# Patient Record
Sex: MALE | Race: BLACK OR AFRICAN AMERICAN | NOT HISPANIC OR LATINO | URBAN - METROPOLITAN AREA
[De-identification: names, ages, dates, MRNs, and addresses within clinical notes are randomized per-mention and may not be internally consistent; named-entity substitution may affect disease eponyms.]

---

## 2018-01-05 ENCOUNTER — EMERGENCY (EMERGENCY)
Facility: HOSPITAL | Age: 28
LOS: 0 days | Discharge: HOME | End: 2018-01-06

## 2018-01-05 DIAGNOSIS — J45.909 UNSPECIFIED ASTHMA, UNCOMPLICATED: ICD-10-CM

## 2018-01-05 DIAGNOSIS — R19.7 DIARRHEA, UNSPECIFIED: ICD-10-CM

## 2018-01-05 DIAGNOSIS — B01.9 VARICELLA WITHOUT COMPLICATION: ICD-10-CM

## 2018-01-05 DIAGNOSIS — Z91.013 ALLERGY TO SEAFOOD: ICD-10-CM

## 2018-01-05 DIAGNOSIS — Z91.010 ALLERGY TO PEANUTS: ICD-10-CM

## 2018-01-05 DIAGNOSIS — R21 RASH AND OTHER NONSPECIFIC SKIN ERUPTION: ICD-10-CM

## 2018-01-06 ENCOUNTER — EMERGENCY (EMERGENCY)
Facility: HOSPITAL | Age: 28
LOS: 0 days | Discharge: HOME | End: 2018-01-06

## 2018-01-06 DIAGNOSIS — J45.909 UNSPECIFIED ASTHMA, UNCOMPLICATED: ICD-10-CM

## 2018-01-06 DIAGNOSIS — R50.9 FEVER, UNSPECIFIED: ICD-10-CM

## 2018-01-06 DIAGNOSIS — R21 RASH AND OTHER NONSPECIFIC SKIN ERUPTION: ICD-10-CM

## 2018-01-06 DIAGNOSIS — K13.0 DISEASES OF LIPS: ICD-10-CM

## 2018-01-06 DIAGNOSIS — Z91.013 ALLERGY TO SEAFOOD: ICD-10-CM

## 2018-01-06 DIAGNOSIS — R59.0 LOCALIZED ENLARGED LYMPH NODES: ICD-10-CM

## 2018-01-06 DIAGNOSIS — Z91.010 ALLERGY TO PEANUTS: ICD-10-CM

## 2018-03-04 ENCOUNTER — EMERGENCY (EMERGENCY)
Facility: HOSPITAL | Age: 28
LOS: 0 days | Discharge: HOME | End: 2018-03-04
Attending: STUDENT IN AN ORGANIZED HEALTH CARE EDUCATION/TRAINING PROGRAM

## 2018-03-04 VITALS
OXYGEN SATURATION: 98 % | TEMPERATURE: 98 F | RESPIRATION RATE: 19 BRPM | DIASTOLIC BLOOD PRESSURE: 94 MMHG | SYSTOLIC BLOOD PRESSURE: 141 MMHG | HEART RATE: 97 BPM

## 2018-03-04 VITALS
HEART RATE: 80 BPM | TEMPERATURE: 98 F | RESPIRATION RATE: 18 BRPM | DIASTOLIC BLOOD PRESSURE: 98 MMHG | SYSTOLIC BLOOD PRESSURE: 149 MMHG | OXYGEN SATURATION: 98 %

## 2018-03-04 DIAGNOSIS — F17.210 NICOTINE DEPENDENCE, CIGARETTES, UNCOMPLICATED: ICD-10-CM

## 2018-03-04 DIAGNOSIS — Z91.013 ALLERGY TO SEAFOOD: ICD-10-CM

## 2018-03-04 DIAGNOSIS — Z91.010 ALLERGY TO PEANUTS: ICD-10-CM

## 2018-03-04 DIAGNOSIS — F10.20 ALCOHOL DEPENDENCE, UNCOMPLICATED: ICD-10-CM

## 2018-03-04 DIAGNOSIS — K92.1 MELENA: ICD-10-CM

## 2018-03-04 DIAGNOSIS — J45.909 UNSPECIFIED ASTHMA, UNCOMPLICATED: ICD-10-CM

## 2018-03-04 DIAGNOSIS — K92.2 GASTROINTESTINAL HEMORRHAGE, UNSPECIFIED: ICD-10-CM

## 2018-03-04 LAB
ALBUMIN SERPL ELPH-MCNC: 4.4 G/DL — SIGNIFICANT CHANGE UP (ref 3–5.5)
ALP SERPL-CCNC: 68 U/L — SIGNIFICANT CHANGE UP (ref 30–115)
ALT FLD-CCNC: 92 U/L — HIGH (ref 0–41)
ANION GAP SERPL CALC-SCNC: 14 MMOL/L — SIGNIFICANT CHANGE UP (ref 7–14)
APTT BLD: 26.8 SEC — LOW (ref 27–39.2)
AST SERPL-CCNC: 131 U/L — HIGH (ref 0–41)
BILIRUB DIRECT SERPL-MCNC: 0.9 MG/DL — HIGH (ref 0–0.2)
BILIRUB INDIRECT FLD-MCNC: 2.1 MG/DL — SIGNIFICANT CHANGE UP
BILIRUB SERPL-MCNC: 3 MG/DL — HIGH (ref 0.2–1.2)
BUN SERPL-MCNC: 9 MG/DL — LOW (ref 10–20)
CALCIUM SERPL-MCNC: 8.5 MG/DL — SIGNIFICANT CHANGE UP (ref 8.5–10.1)
CHLORIDE SERPL-SCNC: 99 MMOL/L — SIGNIFICANT CHANGE UP (ref 98–110)
CO2 SERPL-SCNC: 23 MMOL/L — SIGNIFICANT CHANGE UP (ref 17–32)
CREAT SERPL-MCNC: 0.9 MG/DL — SIGNIFICANT CHANGE UP (ref 0.7–1.5)
GLUCOSE SERPL-MCNC: 105 MG/DL — SIGNIFICANT CHANGE UP (ref 70–110)
HCT VFR BLD CALC: 46 % — SIGNIFICANT CHANGE UP (ref 42–52)
HGB BLD-MCNC: 16.4 G/DL — SIGNIFICANT CHANGE UP (ref 14–18)
INR BLD: 1.13 RATIO — SIGNIFICANT CHANGE UP (ref 0.65–1.3)
MCHC RBC-ENTMCNC: 32.9 PG — HIGH (ref 27–31)
MCHC RBC-ENTMCNC: 35.7 G/DL — SIGNIFICANT CHANGE UP (ref 32–37)
MCV RBC AUTO: 92.2 FL — SIGNIFICANT CHANGE UP (ref 80–94)
NRBC # BLD: 0 /100 WBCS — SIGNIFICANT CHANGE UP (ref 0–0)
PLATELET # BLD AUTO: 247 K/UL — SIGNIFICANT CHANGE UP (ref 130–400)
POTASSIUM SERPL-MCNC: 5.5 MMOL/L — HIGH (ref 3.5–5)
POTASSIUM SERPL-SCNC: 5.5 MMOL/L — HIGH (ref 3.5–5)
PROT SERPL-MCNC: 6.7 G/DL — SIGNIFICANT CHANGE UP (ref 6–8)
PROTHROM AB SERPL-ACNC: 12.3 SEC — SIGNIFICANT CHANGE UP (ref 9.95–12.87)
RBC # BLD: 4.99 M/UL — SIGNIFICANT CHANGE UP (ref 4.7–6.1)
RBC # FLD: 12.1 % — SIGNIFICANT CHANGE UP (ref 11.5–14.5)
SODIUM SERPL-SCNC: 136 MMOL/L — SIGNIFICANT CHANGE UP (ref 135–146)
WBC # BLD: 9.3 K/UL — SIGNIFICANT CHANGE UP (ref 4.8–10.8)
WBC # FLD AUTO: 9.3 K/UL — SIGNIFICANT CHANGE UP (ref 4.8–10.8)

## 2018-03-04 RX ORDER — ONDANSETRON 8 MG/1
4 TABLET, FILM COATED ORAL ONCE
Qty: 0 | Refills: 0 | Status: COMPLETED | OUTPATIENT
Start: 2018-03-04 | End: 2018-03-04

## 2018-03-04 RX ORDER — PANTOPRAZOLE SODIUM 20 MG/1
40 TABLET, DELAYED RELEASE ORAL ONCE
Qty: 0 | Refills: 0 | Status: COMPLETED | OUTPATIENT
Start: 2018-03-04 | End: 2018-03-04

## 2018-03-04 RX ORDER — SODIUM CHLORIDE 9 MG/ML
1000 INJECTION INTRAMUSCULAR; INTRAVENOUS; SUBCUTANEOUS ONCE
Qty: 0 | Refills: 0 | Status: COMPLETED | OUTPATIENT
Start: 2018-03-04 | End: 2018-03-04

## 2018-03-04 RX ADMIN — SODIUM CHLORIDE 1000 MILLILITER(S): 9 INJECTION INTRAMUSCULAR; INTRAVENOUS; SUBCUTANEOUS at 10:54

## 2018-03-04 RX ADMIN — Medication 3030 MILLIGRAM(S): at 10:55

## 2018-03-04 RX ADMIN — ONDANSETRON 4 MILLIGRAM(S): 8 TABLET, FILM COATED ORAL at 10:55

## 2018-03-04 RX ADMIN — PANTOPRAZOLE SODIUM 40 MILLIGRAM(S): 20 TABLET, DELAYED RELEASE ORAL at 10:55

## 2018-03-04 NOTE — ED PROVIDER NOTE - MEDICAL DECISION MAKING DETAILS
I personally evaluated the patient. I reviewed the Resident’s or Physician Assistant’s note (as assigned above), and agree with the findings and plan except as documented in my note. 26yo M no pmh as above, p/w mult episodes of bloody vomiting x 3 days, improved today. + black stool, + abd pain, no prior GI bleed or scope. No prior surgery. Drinks about 50 oz ETOH QD, no other drugs. + decreased PO. PE: as noted, abd s/t, no r/g, rectal above. Impression GIB: gastritis vs PUD. Plan Labs, IVF, PPI, reassess

## 2018-03-04 NOTE — ED ADULT TRIAGE NOTE - CHIEF COMPLAINT QUOTE
patient complaining of vomiting with blood and dark black stools that began 2 days ago, abdominal pain located in mid region, non radiating, described as "gut wrenching" patient states he is a daily drinker of maltliquor

## 2018-03-04 NOTE — ED PROVIDER NOTE - CARE PLAN
Principal Discharge DX:	GI bleed  Secondary Diagnosis:	Alcoholism  Secondary Diagnosis:	Nausea & vomiting

## 2018-03-04 NOTE — ED PROVIDER NOTE - PROGRESS NOTE DETAILS
pt reports resolution of tremors with ativan. He also reports resolution of n/v. He relates that he did not vomiting blood today. counseled that needs GI follow up for endoscopy. he reports he will follow up this week. counseled to return if symptoms return. Counseled on red flags and to return for them. Counseled on importance of follow up. Patient repeats back instructions.

## 2018-03-04 NOTE — ED PROVIDER NOTE - NS ED ROS FT
Review of Systems    Constitutional: (-) fever  Eyes/ENT: (-) blurry vision  Cardiovascular: (-) chest pain, (-) syncope  Respiratory: (-) cough, (-) shortness of breath  Gastrointestinal:  (-) diarrhea  Musculoskeletal: (-) neck pain, (-) back pain  Integumentary: (-) rash, (-) edema  Neurological: (-) headache, (-) altered mental status  Allergic/Immunologic: (-) pruritus

## 2018-03-04 NOTE — ED PROVIDER NOTE - OBJECTIVE STATEMENT
26 y/o M with PMH ETOH abuse (50oz beer per day), asthma, tobacco use presents with black stool x today and nausea with 10 episodes of progressively improving bright red vomiting x 3 days s/p alcohol binge. +abdominal cramping. He has been unable to drink his typical amount of ETOH today and yesterday due to nausea. He denies fevers, chills, sweats, recent travel, recent illness, hx of prior, CP, SOB, [place], lightheadedness, palpitations, pallor, weakness, dizziness, difficulty walking, trauma, drug use, surgeries, recent endoscopy/colonoscopy, other symptoms, abdominal surgeries in past, back pain, LOC.

## 2018-03-04 NOTE — ED PROVIDER NOTE - PHYSICAL EXAMINATION
PHYSICAL EXAM:    GENERAL: Alert, appears stated age, well appearing, non-toxic  SKIN: Warm, pink and dry. MMM.   EYE: Normal lids/conjunctiva  ENT: Normal hearing, patent oropharynx  NECK: +supple. No meningismus  Pulm: Bilateral BS, normal resp effort, no wheezes, stridor, or retractions  CV: RRR, no M/R/G, 2+ pulses throughout  Abd: soft, non-tender, non-distended, no hepatosplenomegaly. no CVA tenderness.   RECTAL: anorectal area without hemorrhoids or lesions. dark brown stool hemoccult negative. chaperoned by ROXANE Mahtews: no erythema, cyanosis, edema  Neuro: AAOx3, no gross motor deficits. No speech slurring. 5/5 strength throughout. normal gait.

## 2018-03-10 ENCOUNTER — EMERGENCY (EMERGENCY)
Facility: HOSPITAL | Age: 28
LOS: 0 days | Discharge: HOME | End: 2018-03-11
Attending: EMERGENCY MEDICINE

## 2018-03-10 VITALS
HEART RATE: 135 BPM | RESPIRATION RATE: 22 BRPM | DIASTOLIC BLOOD PRESSURE: 69 MMHG | WEIGHT: 184.97 LBS | SYSTOLIC BLOOD PRESSURE: 167 MMHG | HEIGHT: 71 IN | OXYGEN SATURATION: 98 %

## 2018-03-10 DIAGNOSIS — F91.8 OTHER CONDUCT DISORDERS: ICD-10-CM
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
## 2018-03-10 DIAGNOSIS — S00.01XA ABRASION OF SCALP, INITIAL ENCOUNTER: ICD-10-CM

## 2018-03-10 DIAGNOSIS — F20.9 SCHIZOPHRENIA, UNSPECIFIED: ICD-10-CM

## 2018-03-10 DIAGNOSIS — S00.81XA ABRASION OF OTHER PART OF HEAD, INITIAL ENCOUNTER: ICD-10-CM

## 2018-03-10 DIAGNOSIS — Z91.013 ALLERGY TO SEAFOOD: ICD-10-CM
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
## 2018-03-10 DIAGNOSIS — S09.90XA UNSPECIFIED INJURY OF HEAD, INITIAL ENCOUNTER: ICD-10-CM

## 2018-03-10 DIAGNOSIS — Z91.010 ALLERGY TO PEANUTS: ICD-10-CM
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
## 2018-03-10 NOTE — ED PROVIDER NOTE - PHYSICAL EXAMINATION
VITAL SIGNS: I have reviewed nursing notes and confirm.  CONSTITUTIONAL: Well-developed; well-nourished; in no acute distress.  SKIN: +0.5cm abrasion to occiput. +1cm abrasion to chin. Remainder of skin exam is warm and dry, no acute rash.  HEAD: Normocephalic; atraumatic.  EYES: PERRL, EOM intact; conjunctiva and sclera clear.  ENT: No nasal discharge; airway clear.   NECK: Supple; non tender. No C-spine tenderness.  CARD: S1, S2 normal; no murmurs, gallops, or rubs. Regular rate and rhythm.  RESP: Clear to auscultation bilaterally. No wheezes, rales or rhonchi.  ABD: Normal bowel sounds; soft; non-distended; non-tender.  EXT: Normal ROM. No edema.  LYMPH: No acute cervical adenopathy.  NEURO: Alert, oriented. Grossly unremarkable. No focal deficits.

## 2018-03-10 NOTE — ED PROVIDER NOTE - NS ED ROS FT
Review of Systems:  	•	CONSTITUTIONAL - no fever, no diaphoresis, no chills  	•	SKIN - no rash  	•	HEMATOLOGIC - no bleeding, no bruising  	•	EYES - no eye pain, no blurry vision  	•	ENT - no change in hearing, no ear pain or tinnitus  	•	RESPIRATORY - no shortness of breath, no cough  	•	CARDIAC - no chest pain, no palpitations  	•	GI - no abd pain, no nausea, no vomiting, no diarrhea, no constipation  	•	MUSCULOSKELETAL - no joint paint, no swelling, no redness  	•	NEUROLOGIC - no weakness, no headache, no paresthesias, no LOC  	•	PSYCH - +schizophrenia, +bipolar, non suicidal, non homicidal
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
## 2018-03-11 VITALS — HEART RATE: 118 BPM

## 2018-03-11 DIAGNOSIS — R45.6 VIOLENT BEHAVIOR: ICD-10-CM

## 2018-03-11 DIAGNOSIS — F60.9 PERSONALITY DISORDER, UNSPECIFIED: ICD-10-CM

## 2018-03-11 DIAGNOSIS — F10.20 ALCOHOL DEPENDENCE, UNCOMPLICATED: ICD-10-CM
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
## 2018-03-11 NOTE — ED BEHAVIORAL HEALTH ASSESSMENT NOTE - DETAILS
gf has alcohol use d/o , they drink together. gi bleed pt was offered dual focus referral and detox/rehab- refused violent pt claims that he overdosed on a substance - unable to say what- and was tx'ed at [facility]

## 2018-03-11 NOTE — ED BEHAVIORAL HEALTH ASSESSMENT NOTE - RISK ASSESSMENT
pt may develop alcohol withdrawal sxs in a few hours.    Pt is not acutely manic, depressed or psychotic. He is not acutely suicidal or homicidal.
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
## 2018-12-27 ENCOUNTER — EMERGENCY (EMERGENCY)
Facility: HOSPITAL | Age: 28
LOS: 0 days | Discharge: HOME | End: 2018-12-27
Attending: EMERGENCY MEDICINE | Admitting: EMERGENCY MEDICINE

## 2018-12-27 VITALS
RESPIRATION RATE: 18 BRPM | TEMPERATURE: 97 F | SYSTOLIC BLOOD PRESSURE: 140 MMHG | DIASTOLIC BLOOD PRESSURE: 98 MMHG | OXYGEN SATURATION: 98 % | HEART RATE: 80 BPM

## 2018-12-27 VITALS
SYSTOLIC BLOOD PRESSURE: 169 MMHG | OXYGEN SATURATION: 99 % | TEMPERATURE: 96 F | RESPIRATION RATE: 18 BRPM | HEART RATE: 82 BPM | DIASTOLIC BLOOD PRESSURE: 109 MMHG

## 2018-12-27 DIAGNOSIS — S29.9XXA UNSPECIFIED INJURY OF THORAX, INITIAL ENCOUNTER: ICD-10-CM

## 2018-12-27 DIAGNOSIS — Z91.013 ALLERGY TO SEAFOOD: ICD-10-CM
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
## 2018-12-27 DIAGNOSIS — J45.909 UNSPECIFIED ASTHMA, UNCOMPLICATED: ICD-10-CM
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
## 2018-12-27 DIAGNOSIS — S22.32XA FRACTURE OF ONE RIB, LEFT SIDE, INITIAL ENCOUNTER FOR CLOSED FRACTURE: ICD-10-CM
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
## 2018-12-27 DIAGNOSIS — R25.1 TREMOR, UNSPECIFIED: ICD-10-CM

## 2018-12-27 DIAGNOSIS — Z91.010 ALLERGY TO PEANUTS: ICD-10-CM

## 2018-12-27 PROBLEM — F10.20 ALCOHOL DEPENDENCE, UNCOMPLICATED: Chronic | Status: ACTIVE | Noted: 2018-03-04

## 2018-12-27 NOTE — ED ADULT NURSE NOTE - MODE OF DISCHARGE
PRE-SEDATION ASSESSMENT    CONSENT  Consent for procedure and sedation obtained: Yes    MEDICAL HISTORY       PHYSICAL EXAM  History and Physical Reviewed: Patient has valid H&P within 30 days. I have reviewed and there are no changes.  Airway Risk History: No previous complications  Airway Anatomy : Class III  Heart : Normal  Lungs : Normal  LOC/Mental Status : Normal    OTHER FINDINGS  Reviewed current medications and allergies: Yes  Pertinent lab/diagnostic test reviewed: Yes    SEDATION RISK ASSESSMENT  Risk Status ASA: Class III - Severe systemic disease, limits activity, is not incapacitated  Plan for Sedation: Moderate Sedation  EKG Monitoring: Yes    NARRATIVE FINDINGS      Ambulatory

## 2018-12-27 NOTE — ED ADULT NURSE NOTE - NSIMPLEMENTINTERV_GEN_ALL_ED
Implemented All Universal Safety Interventions:  [place] to call system. Call bell, personal items and telephone within reach. Instruct patient to call for assistance. Room bathroom lighting operational. Non-slip footwear when patient is off stretcher. Physically safe environment: no spills, clutter or unnecessary equipment. Stretcher in lowest position, wheels locked, appropriate side rails in place.

## 2018-12-27 NOTE — ED PROVIDER NOTE - CARE PROVIDER_API CALL
Murali Briggs (DO), Critical Care Medicine; Pulmonary Disease; Sleep Medicine  [address] [place] [address]  Phone: [phone]  Fax: [phone]

## 2018-12-27 NOTE — ED PROVIDER NOTE - MEDICAL DECISION MAKING DETAILS
rib contusion as above, lungs cta abd snt, supportive care. Patient counseled regarding conditions which should prompt return.

## 2018-12-27 NOTE — ED PROVIDER NOTE - NS ED ROS FT
Review of Systems    Constitutional: (-) fever  Eyes/ENT: (-) blurry vision,  Cardiovascular: (-) chest pain,   Respiratory: (-) cough, (-) shortness of breath +left rib pain  Gastrointestinal:(-) abdominal pain  Musculoskeletal: (-) back pain,   Integumentary: (-) rash, +bruising to left back  Neurological: (-) headache,   Psychiatric: (-) hallucinations  Allergic/Immunologic: (-) pruritus

## 2018-12-27 NOTE — ED PROVIDER NOTE - OBJECTIVE STATEMENT
29 y/o male with hx of alcohol abuse, personality disorder, s/p fall 3 days ago striking his left chest on metal grill. patient denies any neck or back pain . patient denies any head injury. patient states fall from standing position. patient c/o pain with deep inspiration and movement. patient denies any gross hematuria or abdominal pain.

## 2018-12-27 NOTE — ED PROVIDER NOTE - PHYSICAL EXAMINATION
Vital Signs: I have reviewed the initial vital signs.  Constitutional: well-nourished, no acute distress  Head: atraumatic and normocephalic  Eyes:PERRLA, EOMI,  clear conjunctiva  ENT:  MMM  Cardiovascular: regular rate, regular rhythm, well-perfused extremities  Respiratory: unlabored respiratory effort, clear to auscultation bilaterally, +tenderness to left lower mid axillary ribs/ no crepitation  Gastrointestinal: soft, non-tender abdomen, no CVA tenderness  Neuro: awake, alert, follows commands, oriented, no focal deficits, GCS 15  skin: +resolving bruising to left mid back   msk: +tremulous

## 2018-12-27 NOTE — ED PROVIDER NOTE - CARE PLAN
Principal Discharge DX:	Fall, initial encounter  Secondary Diagnosis:	Closed fracture of one rib of left side, initial encounter

## 2019-02-16 ENCOUNTER — EMERGENCY (EMERGENCY)
Facility: HOSPITAL | Age: 29
LOS: 0 days | Discharge: HOME | End: 2019-02-16
Attending: EMERGENCY MEDICINE | Admitting: EMERGENCY MEDICINE

## 2019-02-16 VITALS
DIASTOLIC BLOOD PRESSURE: 106 MMHG | SYSTOLIC BLOOD PRESSURE: 166 MMHG | TEMPERATURE: 98 F | RESPIRATION RATE: 18 BRPM | HEART RATE: 80 BPM | OXYGEN SATURATION: 97 %

## 2019-02-16 VITALS
OXYGEN SATURATION: 97 % | SYSTOLIC BLOOD PRESSURE: 165 MMHG | HEART RATE: 107 BPM | RESPIRATION RATE: 18 BRPM | TEMPERATURE: 99 F | HEIGHT: 74 IN | WEIGHT: 179.9 LBS | DIASTOLIC BLOOD PRESSURE: 97 MMHG
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
## 2019-02-16 DIAGNOSIS — R51 HEADACHE: ICD-10-CM

## 2019-02-16 DIAGNOSIS — F17.210 NICOTINE DEPENDENCE, CIGARETTES, UNCOMPLICATED: ICD-10-CM

## 2019-02-16 DIAGNOSIS — J45.909 UNSPECIFIED ASTHMA, UNCOMPLICATED: ICD-10-CM
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
## 2019-02-16 DIAGNOSIS — M79.672 PAIN IN LEFT FOOT: ICD-10-CM

## 2019-02-16 DIAGNOSIS — F90.9 ATTENTION-DEFICIT HYPERACTIVITY DISORDER, UNSPECIFIED TYPE: ICD-10-CM

## 2019-02-16 DIAGNOSIS — Z91.010 ALLERGY TO PEANUTS: ICD-10-CM

## 2019-02-16 DIAGNOSIS — S69.91XA UNSPECIFIED INJURY OF RIGHT WRIST, HAND AND FINGER(S), INITIAL ENCOUNTER: ICD-10-CM
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
## 2019-02-16 DIAGNOSIS — Z23 ENCOUNTER FOR IMMUNIZATION: ICD-10-CM

## 2019-02-16 DIAGNOSIS — S01.01XA LACERATION WITHOUT FOREIGN BODY OF SCALP, INITIAL ENCOUNTER: ICD-10-CM
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
## 2019-02-16 PROBLEM — F60.9 PERSONALITY DISORDER, UNSPECIFIED: Chronic | Status: ACTIVE | Noted: 2018-12-27

## 2019-02-16 RX ORDER — KETOROLAC TROMETHAMINE 30 MG/ML
30 SYRINGE (ML) INJECTION ONCE
Qty: 0 | Refills: 0 | Status: DISCONTINUED | OUTPATIENT
Start: 2019-02-16 | End: 2019-02-16

## 2019-02-16 RX ORDER — TETANUS TOXOID, REDUCED DIPHTHERIA TOXOID AND ACELLULAR PERTUSSIS VACCINE, ADSORBED 5; 2.5; 8; 8; 2.5 [IU]/.5ML; [IU]/.5ML; UG/.5ML; UG/.5ML; UG/.5ML
0.5 SUSPENSION INTRAMUSCULAR ONCE
Qty: 0 | Refills: 0 | Status: COMPLETED | OUTPATIENT
Start: 2019-02-16 | End: 2019-02-16

## 2019-02-16 RX ORDER — SODIUM CHLORIDE 9 MG/ML
1000 INJECTION INTRAMUSCULAR; INTRAVENOUS; SUBCUTANEOUS ONCE
Qty: 0 | Refills: 0 | Status: COMPLETED | OUTPATIENT
Start: 2019-02-16 | End: 2019-02-16

## 2019-02-16 RX ADMIN — TETANUS TOXOID, REDUCED DIPHTHERIA TOXOID AND ACELLULAR PERTUSSIS VACCINE, ADSORBED 0.5 MILLILITER(S): 5; 2.5; 8; 8; 2.5 SUSPENSION INTRAMUSCULAR at 19:00

## 2019-02-16 RX ADMIN — Medication 30 MILLIGRAM(S): at 20:15

## 2019-02-16 RX ADMIN — SODIUM CHLORIDE 1000 MILLILITER(S): 9 INJECTION INTRAMUSCULAR; INTRAVENOUS; SUBCUTANEOUS at 16:46

## 2019-02-16 NOTE — ED ADULT NURSE NOTE - CHIEF COMPLAINT QUOTE
fell   offf  [facility]   9   feet   onto  pavers   last   night      injury   R   hand    L   foot   laceration  to  scalp

## 2019-02-16 NOTE — ED PROVIDER NOTE - NS ED ROS FT
Constitutional: (-) fever (-) malaise (-) diaphoresis (-) chills   Eyes: (-) visual changes (-) eye pain   ENMT: (-) nasal or chest congestion (-) runny nose (-) hearing changes (-) ear pain (-) neck pain (-) neck stiffness  Cardiac: (-) chest pain (-) palpitations (-) syncope  Respiratory: (-) cough (-) SOB  GI: (-) nausea (-) vomiting (-) diarrhea (-) abdominal pain   : (-) dysuria   MS: (+) pain to right 4th digit (+) left foot pain (-) back pain (-) muscle weakness  Neuro: (+) headache (+) head injury (-) dizziness (-) numbness/tingling to extremities B/L (-) weakness (-) LOC (-) AMS   Skin:  (+) laceration to scalp (-) rash  Psychiatric: (-) hallucinations (-) SI/HI (-) intoxication  Except as documented in the HPI, all other systems are negative.

## 2019-02-16 NOTE — ED PROVIDER NOTE - PHYSICAL EXAMINATION
GENERAL: Well-nourished, Well-developed. NAD.  HEAD: + palpable hematoma to right side of head behind ear.  No ecchymosis behind ears B/L.  Eyes: PERRLA, EOMI. No asymmetry. No nystagmus. No conjunctival injection. Non-icteric sclera.  ENMT: MMM. No pharyngeal erythema or exudates. Uvula midline. No oral lesions. No broken teeth. TMs clear with good cone of light B/L. No hemotympanum B/L.  Nares patent. No tongue fassiculations.  Neck: Supple. No LAD. No cervical midline TTP. No paraveretebral TTP to traps. FROM  CVS: No reproducible chest wall tenderness. Normal S1,S2. No murmurs appreciated on auscultation   RESP: No use of accessory muscles. Chest rise symmetrical with good expansion. Lungs clear to auscultation B/L. No wheezing, rales, or rhonchi auscultated.  GI: Normal auscultation of bowel sounds in all 4 quadrants. Soft, Nontender, Nondistended. No guarding or rebound tenderness.   MSK: Limited ROM to 4th digit of right hand with focal TTP to PIP with mild swelling.  Pain worse with extension of right 4th digit. + TTP to dorsal region of right foot. Pain worse with dorsiflexion. Extremities w/o deformity. FROM of upper and lower extremities B/L. No midline spinal TTP. FROM of back with flexion and extension. No vertebral step off. No visisble signs of trauma  Skin: + 3.5 cm laceration to parietal region of scalp with no surrounding erythema. No signs of infection. No active bleeding. No FB.  + superficial abrasion behind right ear overlying palpable hematoma. Warm, Dry. No rashes or lesions. Good cap refill < 2 sec B/L.  EXT: Radial and pedal pulses present B/L. No calf tenderness or swelling B/L. No pedal edema B/L.  Neuro: AA&O x 3. CNs II-XII grossly intact. Speaking in full sentences. No slurring of speech. No facial droop. No tremors. Sensation grossly intact. Strength 5/5 B/L. Gait within normal limits. Negative Romberg and Pronator Drift. Normal Finger to nose exam. Visual fields intact.  Psych:  Appropriate mood and affect. Cooperative. Calm/Anxious. GENERAL: Well-nourished, Well-developed. NAD.  HEAD: + palpable hematoma to right side of head behind ear.  No ecchymosis behind ears B/L.  Eyes: PERRLA, EOMI. No asymmetry. No nystagmus. No conjunctival injection. Non-icteric sclera.  ENMT: MMM. No pharyngeal erythema or exudates. Uvula midline. No oral lesions. No broken teeth. TMs clear with good cone of light B/L. No hemotympanum B/L.  Nares patent. No tongue fassiculations.  Neck: Supple. No LAD. No cervical midline TTP. No paraveretebral TTP to traps. FROM  CVS: No reproducible chest wall tenderness. Normal S1,S2. No murmurs appreciated on auscultation   RESP: No use of accessory muscles. Chest rise symmetrical with good expansion. Lungs clear to auscultation B/L. No wheezing, rales, or rhonchi auscultated.  GI: Normal auscultation of bowel sounds in all 4 quadrants. Soft, Nontender, Nondistended. No guarding or rebound tenderness.   MSK: Limited ROM to 4th digit of right hand with focal TTP to PIP with mild swelling.  Pain worse with extension of right 4th digit. + TTP to dorsal region of right foot. Pain worse with dorsiflexion. Extremities w/o deformity. FROM of upper and lower extremities B/L. No midline spinal TTP. FROM of back with flexion and extension. No vertebral step off.   Skin: + 2.5 cm laceration to parietal region of scalp with no surrounding erythema. No signs of infection. No active bleeding. No FB.  + superficial abrasion behind right ear overlying palpable hematoma. Warm, Dry. No rashes or lesions. Good cap refill < 2 sec B/L.  EXT: Radial and pedal pulses present B/L. No calf tenderness or swelling B/L. No pedal edema B/L.  Neuro: AA&O x 3. CNs II-XII grossly intact. Speaking in full sentences. No slurring of speech. No facial droop. No tremors. Sensation grossly intact. Strength 5/5 B/L. Gait within normal limits. Negative Romberg and Pronator Drift. Normal Finger to nose exam. Visual fields intact.  Psych:  Appropriate mood and affect. Cooperative. Anxious
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
## 2019-02-16 NOTE — ED ADULT TRIAGE NOTE - CHIEF COMPLAINT QUOTE
[facility]   [facility]   onto  pavers   last   night      injury   R   hand    L   foot   laceration  to  scalp

## 2019-02-16 NOTE — ED ADULT NURSE NOTE - NSIMPLEMENTINTERV_GEN_ALL_ED
Implemented All Fall Risk Interventions:  [place] to call system. Call bell, personal items and telephone within reach. Instruct patient to call for assistance. Room bathroom lighting operational. Non-slip footwear when patient is off stretcher. Physically safe environment: no spills, clutter or unnecessary equipment. Stretcher in lowest position, wheels locked, appropriate side rails in place. Provide visual cue, wrist band, yellow gown, etc. Monitor gait and stability. Monitor for mental status changes and reorient to person, place, and time. Review medications for side effects contributing to fall risk. Reinforce activity limits and safety measures with patient and family.

## 2019-02-16 NOTE — ED PROCEDURE NOTE - CPROC ED POST PROC CARE GUIDE1
Instructed patient/caregiver to follow-up with primary care physician./Instructed patient/caregiver regarding signs and symptoms of infection./Verbal/written post procedure instructions were given to patient/caregiver./Keep the cast/splint/dressing clean and dry./Elevate the injured extremity as instructed.
Instructed patient/caregiver to follow-up with primary care physician./Verbal/written post procedure instructions were given to patient/caregiver./Instructed patient/caregiver regarding signs and symptoms of infection./Keep the cast/splint/dressing clean and dry.

## 2019-02-16 NOTE — ED PROCEDURE NOTE - NS ED PERI VASCULAR NEG
no paresthesia/capillary refill time < 2 seconds/no cyanosis of extremity/fingers/toes warm to touch/no swelling

## 2019-02-16 NOTE — ED PROVIDER NOTE - CLINICAL SUMMARY MEDICAL DECISION MAKING FREE TEXT BOX
Images reviewed. Laceration repaired. Finger splint applied. Hand surgery provided. I have full discussed the medical management and delivery of care with the patient. Patient confirms understanding and has been given detailed return precautions. Patient instructed to return to the ED should symptoms persist or worsen. Patient is well appearing upon discharge.

## 2019-02-16 NOTE — ED PROVIDER NOTE - ATTENDING CONTRIBUTION TO CARE
Pt presents hours s/p fall as above with scalp lac, hand and foot pain, on exam vital signs appreciated, has curvilinear lac to right parietal scalp with abrasions over mastoid , Tms normal, neck supple no ctls ttp, cor reg lungs cta abd snt pelvis stable FROM x 4 with 4th digit held flexed with ttp at PIPJ, NVI, also with mild ttp dorsum left foot, will check imaging, update Td, wound repair

## 2019-02-19 ENCOUNTER — OUTPATIENT (OUTPATIENT)
Dept: OUTPATIENT SERVICES | Facility: HOSPITAL | Age: 29
LOS: 1 days | Discharge: HOME | End: 2019-02-19

## 2019-02-19 DIAGNOSIS — F10.10 ALCOHOL ABUSE, UNCOMPLICATED: ICD-10-CM

## 2019-02-19 NOTE — ED POST DISCHARGE NOTE - DETAILS
FED-EX SENT PATIENT CALLED BACK. DISCUSSED ? FX, AND DISCUSSSED ? FB- DOES NOT HAVE A CUT ON THAT 2ND FINGER, NOR WAS THIS FINGER INJURED. HAS SPLINT ON 4TH FINGER AND WILL F/U AS DIRECTED.

## 2019-02-27 ENCOUNTER — EMERGENCY (EMERGENCY)
Facility: HOSPITAL | Age: 29
LOS: 0 days | Discharge: HOME | End: 2019-02-27
Attending: EMERGENCY MEDICINE | Admitting: EMERGENCY MEDICINE

## 2019-02-27 VITALS
OXYGEN SATURATION: 98 % | WEIGHT: 175.05 LBS | DIASTOLIC BLOOD PRESSURE: 95 MMHG | HEIGHT: 74 IN | HEART RATE: 88 BPM | RESPIRATION RATE: 19 BRPM | SYSTOLIC BLOOD PRESSURE: 140 MMHG | TEMPERATURE: 97 F
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
## 2019-02-27 DIAGNOSIS — J45.909 UNSPECIFIED ASTHMA, UNCOMPLICATED: ICD-10-CM

## 2019-02-27 DIAGNOSIS — S01.01XD LACERATION WITHOUT FOREIGN BODY OF SCALP, SUBSEQUENT ENCOUNTER: ICD-10-CM

## 2019-02-27 DIAGNOSIS — R03.0 ELEVATED BLOOD-PRESSURE READING, WITHOUT DIAGNOSIS OF HYPERTENSION: ICD-10-CM

## 2019-02-27 DIAGNOSIS — Z91.013 ALLERGY TO SEAFOOD: ICD-10-CM

## 2019-02-27 DIAGNOSIS — Z48.02 ENCOUNTER FOR REMOVAL OF SUTURES: ICD-10-CM
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
## 2019-02-27 DIAGNOSIS — Z91.010 ALLERGY TO PEANUTS: ICD-10-CM

## 2019-02-27 DIAGNOSIS — T81.40XA INFECTION FOLLOWING A PROCEDURE, UNSPECIFIED, INITIAL ENCOUNTER: ICD-10-CM
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
## 2019-02-27 NOTE — ED PROVIDER NOTE - ATTENDING CONTRIBUTION TO CARE
28yM presents for staple removal from scalp. Staples placed 11d ago for fall down stairs. Pt reports interim development of swelling and purulent drainage, resolved when he picked out one of the staples and expressed the pus.  No fevers.  Otherwise healing well, though he noted a posterior cervical lymph node that enlarged soon afterward and is now slowly decreasing in size.    PMH: denies  PSH: denies  NKDA  UTD on vaccines - last tetanus at time of staples    VS notable for /95  CONSTITUTIONAL: well developed; well nourished; well appearing in no acute distress  HEAD: normocephalic; R parietal scalp lac with overlying dried blood and minimal crusty discharge, no surrounding fluctuance or tenderness, +surrounding erythema  EYES: no conjunctival injection, no scleral icterus  ENT: no nasal discharge; airway clear.  NECK: supple; non tender. + full passive ROM in all directions  CARD: S1, S2 normal; no murmurs, gallops, or rubs. Regular rate and rhythm  RESP: no wheezes, rales or rhonchi. Good air movement bilaterally without significant accessory muscle use  ABD: soft; non-distended; non-tender. No rebound, no guarding, no pulsatile abdominal mass  EXT: moving all extremities spontaneously, normal ROM. No clubbing, cyanosis or edema  SKIN: warm and dry, no lesions noted  NEURO: alert, oriented, CN II-XII grossly intact, motor and sensory grossly intact, speech nonslurred, no focal deficits. GCS 15  PSYCH: calm, cooperative, appropriate, good eye contact, logical thought process, no apparent danger to self or others    staples removed  ? residual cellulitis, likely just inflammation of healing skin - pt declined abx  d/w pt his elevated BP (improved compared to prior visits but pt has not followed up) - will need PCP for ?diagnosis of HTN - needs repeat and likely initiation of antihypertensives; counseled pt today about dietary and lifestyles modifications

## 2019-02-27 NOTE — ED PROVIDER NOTE - PHYSICAL EXAMINATION
VITAL SIGNS: I have reviewed nursing notes and confirm.  CONSTITUTIONAL: Well-developed; well-nourished; in no acute distress.  SKIN: healing laceration to the right  scalp, no bleeding, no drainage, no redness, warmth, swelling, pus.   CARD: S1, S2 normal; no murmurs, gallops, or rubs. Regular rate and rhythm.  RESP: No wheezes, rales or rhonchi.  ABD: Normal bowel sounds; soft; non-distended; non-tender; no hepatosplenomegaly.  EXT: Normal ROM. No clubbing, cyanosis or edema.  LYMPH: No acute cervical adenopathy.  NEURO: Alert, oriented. Grossly unremarkable. No focal deficits.  PSYCH: Cooperative, appropriate.

## 2019-02-27 NOTE — ED PROVIDER NOTE - OBJECTIVE STATEMENT
29yo M presents requesting to have staples removed from his right scalp. Pt states that the staples were placed 11 days ago. Pt states that he did have some pus the day after the staples were placed but it resolved the next day. Pt denies any fever, chills, nasuea, vomiting.

## 2019-02-27 NOTE — ED PROVIDER NOTE - CARE PLAN
Principal Discharge DX:	Encounter for staple removal Principal Discharge DX:	Encounter for staple removal  Secondary Diagnosis:	Elevated blood pressure reading

## 2019-02-27 NOTE — ED PROVIDER NOTE - CLINICAL SUMMARY MEDICAL DECISION MAKING FREE TEXT BOX
28yM presents for staple removal. Wound c/b wound infection - cellulitis w/ abscess draining pus, resolved at home when pt opened wound to manually express the pus.  Pt declines offered abx for ?residual cellulitis.  Wound is otherwise healing well.  D/w pt elevated BP, counseled to f/u with PCP for BP recheck and initiation of meds as needed.

## 2019-02-27 NOTE — ED PROVIDER NOTE - NS ED ROS FT
Review of Systems    Constitutional: (-) fever/ chills (-) weight loss  Cardiovascular: (-) chest pain, (-) syncope (-) palpitations  Respiratory: (-) cough, (-) shortness of breath  Musculoskeletal: (-) neck pain, (-) back pain, (-) joint pain (-) pedal edema   Integumentary:(+) healing laceration to the right scalp   Neurological: (-) headache, (-) altered mental status

## 2019-03-20 ENCOUNTER — OUTPATIENT (OUTPATIENT)
Dept: OUTPATIENT SERVICES | Facility: HOSPITAL | Age: 29
LOS: 1 days | Discharge: HOME | End: 2019-03-20

## 2019-03-20 DIAGNOSIS — F11.20 OPIOID DEPENDENCE, UNCOMPLICATED: ICD-10-CM

## 2019-03-21 ENCOUNTER — OUTPATIENT (OUTPATIENT)
Dept: OUTPATIENT SERVICES | Facility: HOSPITAL | Age: 29
LOS: 1 days | Discharge: HOME | End: 2019-03-21

## 2019-03-21 DIAGNOSIS — Z00.00 ENCOUNTER FOR GENERAL ADULT MEDICAL EXAMINATION WITHOUT ABNORMAL FINDINGS: ICD-10-CM

## 2019-03-21 LAB
ALBUMIN SERPL ELPH-MCNC: 4.5 G/DL — SIGNIFICANT CHANGE UP (ref 3.5–5.2)
ALP SERPL-CCNC: 87 U/L — SIGNIFICANT CHANGE UP (ref 30–115)
ALT FLD-CCNC: 58 U/L — HIGH (ref 0–41)
ANION GAP SERPL CALC-SCNC: 13 MMOL/L — SIGNIFICANT CHANGE UP (ref 7–14)
AST SERPL-CCNC: 28 U/L — SIGNIFICANT CHANGE UP (ref 0–41)
BILIRUB SERPL-MCNC: 0.3 MG/DL — SIGNIFICANT CHANGE UP (ref 0.2–1.2)
BUN SERPL-MCNC: 7 MG/DL — LOW (ref 10–20)
CALCIUM SERPL-MCNC: 9.3 MG/DL — SIGNIFICANT CHANGE UP (ref 8.5–10.1)
CHLORIDE SERPL-SCNC: 102 MMOL/L — SIGNIFICANT CHANGE UP (ref 98–110)
CHOLEST SERPL-MCNC: 165 MG/DL — SIGNIFICANT CHANGE UP (ref 100–200)
CO2 SERPL-SCNC: 25 MMOL/L — SIGNIFICANT CHANGE UP (ref 17–32)
CREAT SERPL-MCNC: 0.7 MG/DL — SIGNIFICANT CHANGE UP (ref 0.7–1.5)
ESTIMATED AVERAGE GLUCOSE: 103 MG/DL — SIGNIFICANT CHANGE UP (ref 68–114)
GLUCOSE SERPL-MCNC: 86 MG/DL — SIGNIFICANT CHANGE UP (ref 70–99)
HBA1C BLD-MCNC: 5.2 % — SIGNIFICANT CHANGE UP (ref 4–5.6)
HCT VFR BLD CALC: 45.4 % — SIGNIFICANT CHANGE UP (ref 42–52)
HDLC SERPL-MCNC: 43 MG/DL — SIGNIFICANT CHANGE UP
HGB BLD-MCNC: 15.1 G/DL — SIGNIFICANT CHANGE UP (ref 14–18)
LIPID PNL WITH DIRECT LDL SERPL: 95 MG/DL — SIGNIFICANT CHANGE UP (ref 4–129)
MAGNESIUM SERPL-MCNC: 2.1 MG/DL — SIGNIFICANT CHANGE UP (ref 1.8–2.4)
MCHC RBC-ENTMCNC: 31.9 PG — HIGH (ref 27–31)
MCHC RBC-ENTMCNC: 33.3 G/DL — SIGNIFICANT CHANGE UP (ref 32–37)
MCV RBC AUTO: 96 FL — HIGH (ref 80–94)
NRBC # BLD: 0 /100 WBCS — SIGNIFICANT CHANGE UP (ref 0–0)
PLATELET # BLD AUTO: 283 K/UL — SIGNIFICANT CHANGE UP (ref 130–400)
POTASSIUM SERPL-MCNC: 4.6 MMOL/L — SIGNIFICANT CHANGE UP (ref 3.5–5)
POTASSIUM SERPL-SCNC: 4.6 MMOL/L — SIGNIFICANT CHANGE UP (ref 3.5–5)
PROT SERPL-MCNC: 6.9 G/DL — SIGNIFICANT CHANGE UP (ref 6–8)
RBC # BLD: 4.73 M/UL — SIGNIFICANT CHANGE UP (ref 4.7–6.1)
RBC # FLD: 12.5 % — SIGNIFICANT CHANGE UP (ref 11.5–14.5)
SODIUM SERPL-SCNC: 140 MMOL/L — SIGNIFICANT CHANGE UP (ref 135–146)
TOTAL CHOLESTEROL/HDL RATIO MEASUREMENT: 3.8 RATIO — LOW (ref 4–5.5)
TRIGL SERPL-MCNC: 244 MG/DL — HIGH (ref 10–149)
WBC # BLD: 6.56 K/UL — SIGNIFICANT CHANGE UP (ref 4.8–10.8)
WBC # FLD AUTO: 6.56 K/UL — SIGNIFICANT CHANGE UP (ref 4.8–10.8)

## 2019-03-22 LAB
APPEARANCE UR: CLEAR — SIGNIFICANT CHANGE UP
BILIRUB UR-MCNC: NEGATIVE — SIGNIFICANT CHANGE UP
COLOR SPEC: YELLOW — SIGNIFICANT CHANGE UP
DIFF PNL FLD: NEGATIVE — SIGNIFICANT CHANGE UP
ETHANOL SERPL-MCNC: <10 MG/DL — HIGH
GLUCOSE UR QL: NEGATIVE MG/DL — SIGNIFICANT CHANGE UP
HAV IGM SER-ACNC: SIGNIFICANT CHANGE UP
HBV CORE IGM SER-ACNC: SIGNIFICANT CHANGE UP
HBV SURFACE AG SER-ACNC: SIGNIFICANT CHANGE UP
HCV AB S/CO SERPL IA: 0.07 S/CO — SIGNIFICANT CHANGE UP (ref 0–0.79)
HCV AB SERPL-IMP: SIGNIFICANT CHANGE UP
HIV 1+2 AB+HIV1 P24 AG SERPL QL IA: SIGNIFICANT CHANGE UP
KETONES UR-MCNC: NEGATIVE — SIGNIFICANT CHANGE UP
LEUKOCYTE ESTERASE UR-ACNC: NEGATIVE — SIGNIFICANT CHANGE UP
NITRITE UR-MCNC: NEGATIVE — SIGNIFICANT CHANGE UP
PH UR: 6.5 — SIGNIFICANT CHANGE UP (ref 5–8)
PROT UR-MCNC: NEGATIVE MG/DL — SIGNIFICANT CHANGE UP
SP GR SPEC: 1.02 — SIGNIFICANT CHANGE UP (ref 1.01–1.03)
T PALLIDUM AB TITR SER: NEGATIVE — SIGNIFICANT CHANGE UP
UROBILINOGEN FLD QL: 0.2 MG/DL — SIGNIFICANT CHANGE UP (ref 0.2–0.2)

## 2019-03-26 LAB
GAMMA INTERFERON BACKGROUND BLD IA-ACNC: 0.01 IU/ML — SIGNIFICANT CHANGE UP
M TB IFN-G BLD-IMP: NEGATIVE — SIGNIFICANT CHANGE UP
M TB IFN-G CD4+ BCKGRND COR BLD-ACNC: 0 IU/ML — SIGNIFICANT CHANGE UP
M TB IFN-G CD4+CD8+ BCKGRND COR BLD-ACNC: 0 IU/ML — SIGNIFICANT CHANGE UP
QUANT TB PLUS MITOGEN MINUS NIL: >10 IU/ML — SIGNIFICANT CHANGE UP

## 2019-03-27 ENCOUNTER — OUTPATIENT (OUTPATIENT)
Dept: OUTPATIENT SERVICES | Facility: HOSPITAL | Age: 29
LOS: 1 days | Discharge: HOME | End: 2019-03-27

## 2019-03-27 DIAGNOSIS — F10.10 ALCOHOL ABUSE, UNCOMPLICATED: ICD-10-CM

## 2019-03-28 ENCOUNTER — OUTPATIENT (OUTPATIENT)
Dept: OUTPATIENT SERVICES | Facility: HOSPITAL | Age: 29
LOS: 1 days | Discharge: HOME | End: 2019-03-28

## 2019-03-28 DIAGNOSIS — F10.10 ALCOHOL ABUSE, UNCOMPLICATED: ICD-10-CM

## 2019-04-04 ENCOUNTER — OUTPATIENT (OUTPATIENT)
Dept: OUTPATIENT SERVICES | Facility: HOSPITAL | Age: 29
LOS: 1 days | Discharge: HOME | End: 2019-04-04

## 2019-04-04 DIAGNOSIS — F10.10 ALCOHOL ABUSE, UNCOMPLICATED: ICD-10-CM

## 2019-04-05 ENCOUNTER — OUTPATIENT (OUTPATIENT)
Dept: OUTPATIENT SERVICES | Facility: HOSPITAL | Age: 29
LOS: 1 days | Discharge: HOME | End: 2019-04-05

## 2019-04-05 DIAGNOSIS — F11.20 OPIOID DEPENDENCE, UNCOMPLICATED: ICD-10-CM

## 2019-04-09 ENCOUNTER — EMERGENCY (EMERGENCY)
Facility: HOSPITAL | Age: 29
LOS: 0 days | Discharge: HOME | End: 2019-04-09
Attending: EMERGENCY MEDICINE | Admitting: EMERGENCY MEDICINE
Payer: SUBSIDIZED

## 2019-04-09 VITALS
TEMPERATURE: 99 F | HEART RATE: 98 BPM | WEIGHT: 195.11 LBS | OXYGEN SATURATION: 98 % | DIASTOLIC BLOOD PRESSURE: 113 MMHG | SYSTOLIC BLOOD PRESSURE: 155 MMHG | RESPIRATION RATE: 16 BRPM | HEIGHT: 74 IN

## 2019-04-09 VITALS — HEART RATE: 89 BPM | SYSTOLIC BLOOD PRESSURE: 145 MMHG | DIASTOLIC BLOOD PRESSURE: 90 MMHG

## 2019-04-09 DIAGNOSIS — J45.909 UNSPECIFIED ASTHMA, UNCOMPLICATED: ICD-10-CM

## 2019-04-09 DIAGNOSIS — Z91.010 ALLERGY TO PEANUTS: ICD-10-CM
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
## 2019-04-09 DIAGNOSIS — M79.643 PAIN IN UNSPECIFIED HAND: ICD-10-CM

## 2019-04-09 DIAGNOSIS — S60.221A CONTUSION OF RIGHT HAND, INITIAL ENCOUNTER: ICD-10-CM

## 2019-04-09 DIAGNOSIS — Z91.013 ALLERGY TO SEAFOOD: ICD-10-CM
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
## 2019-04-09 PROCEDURE — 73130 X-RAY EXAM OF HAND: CPT | Mod: 26,RT

## 2019-04-09 PROCEDURE — 29125 APPL SHORT ARM SPLINT STATIC: CPT

## 2019-04-09 PROCEDURE — 99283 EMERGENCY DEPT VISIT LOW MDM: CPT | Mod: 25

## 2019-04-09 NOTE — ED PROVIDER NOTE - CLINICAL SUMMARY MEDICAL DECISION MAKING FREE TEXT BOX
pw hand injury, pain. xray negative. Splinted for comfort, given return precautions and home care instructions. Patient to be discharged from ED. Any available test results were discussed with patient and/or family. Verbal instructions given, including instructions to return to ED immediately for any new, worsening, or concerning symptoms. Patient endorsed understanding. Written discharge instructions additionally given, including follow-up plan with orthopedics.

## 2019-04-09 NOTE — ED PROVIDER NOTE - ATTENDING CONTRIBUTION TO CARE
29 y M no PMH pw right hand injury, fell backwards onto it >12 hours pta, with fingers and wrist dorsiflexion in fingers 2-5. with pain and swelling, with ttp, to palmar and dorsum over MCPs. No skin break. Pain limits ROM, however sensation and motor are fully intact. Tendons on stressing are grossly intact by strength testing on resistance to examiner, cap refill <2 sec distally to all digits of right hand and equal to contralateral. wrist without tenderness to snuffbox or elsewhere. No skin changes. Soft compartments of hand and forearm.  A/P: eval for bony injury. xray negative. Splint for comfort, given return precautions and home care instructinos.

## 2019-04-09 NOTE — ED PROVIDER NOTE - NSFOLLOWUPINSTRUCTIONS_ED_ALL_ED_FT
Contusion in Adults    WHAT YOU NEED TO KNOW:    A contusion is a bruise that appears on your skin after an injury. A bruise happens when small blood vessels tear but skin does not. When blood vessels tear, blood leaks into nearby tissue, such as soft tissue or muscle.    DISCHARGE INSTRUCTIONS:    Return to the emergency department if:     You have new trouble moving the injured area.      You have tingling or numbness in or near the injured area.      Your hand or foot below the bruise gets cold or turns pale.     Contact your healthcare provider if:     You find a new lump in the injured area.      Your symptoms do not improve with treatment after 4 to 5 days.      You have questions or concerns about your condition or care.    Medicines: You may need any of the following:     NSAIDs help decrease swelling and pain or fever. This medicine is available with or without a doctor's order. NSAIDs can cause stomach bleeding or kidney problems in certain people. If you take blood thinner medicine, always ask your healthcare provider if NSAIDs are safe for you. Always read the medicine label and follow directions.      Prescription pain medicine may be given. Do not wait until the pain is severe before you take your medicine.      Take your medicine as directed. Contact your healthcare provider if you think your medicine is not helping or if you have side effects. Tell him of her if you are allergic to any medicine. Keep a list of the medicines, vitamins, and herbs you take. Include the amounts, and when and why you take them. Bring the list or the pill bottles to follow-up visits. Carry your medicine list with you in case of an emergency.    Follow up with your healthcare provider as directed: You may need to return within a week to check your injury again. Write down your questions so you remember to ask them during your visits.    Help a contusion heal:     Rest the injured area or use it less than usual. If you bruised your leg or foot, you may need crutches or a cane to help you walk. This will help you keep weight off your injured body part.       Apply ice to decrease swelling and pain. Ice may also help prevent tissue damage. Use an ice pack, or put crushed ice in a plastic bag. Cover it with a towel and place it on your bruise for 15 to 20 minutes every hour or as directed.      Use compression to support the area and decrease swelling. Wrap an elastic bandage around the area over the bruised muscle. Make sure the bandage is not too tight. You should be able to fit 1 finger between the bandage and your skin.      Elevate (raise) your injured body part above the level of your heart to help decrease pain and swelling. Use pillows, blankets, or rolled towels to elevate the area as often as you can.      Do not drink alcohol as directed. Alcohol may slow healing.      Do not stretch injured muscles right after your injury. Ask your healthcare provider when and how you may safely stretch after your injury. Gentle stretches can help increase your flexibility.      Do not massage the area or put heating pads on the bruise right after your injury. Heat and massage may slow healing. Your healthcare provider may tell you to apply heat after several days. At that time, heat will start to help the injury heal.    Prevent another contusion:     Stretch and warm up before you play sports or exercise.      Wear protective gear when you play sports. Examples are shin guards and padding.       If you begin a new physical activity, start slowly to give your body a chance to adjust.         © Copyright [employer] (formerly known as [employer] 2019 All illustrations and images included in CareNotes are the copyrighted property of [employer] or [employer].

## 2019-04-09 NOTE — ED PROVIDER NOTE - PHYSICAL EXAMINATION
Physical Exam    Vital Signs: I have reviewed the initial vital signs.  Constitutional: well-nourished, appears stated age, no acute distress  Musculoskeletal: supple neck, no lower extremity edema, no midline tenderness + right hand swelling and tenderness to hand extneding to fingers no redness no warmth. FROM of hand with pain.   Integumentary: warm, dry, no rash  Neurologic: awake, alert, cranial nerves II-XII grossly intact, extremities’ motor and sensory functions grossly intact  Psychiatric: appropriate mood, appropriate affect

## 2019-04-09 NOTE — ED ADULT NURSE NOTE - NSFALLRSKINDICATORS_ED_ALL_ED
Phone call received from patient's wife Aliyah who reports patient was extremely agitated last night, could not sleep, was up all night (as was she). She states his breathing was fine, he appeared comfortable, but was restless for several hours.  Writer advised that she does have Ativan in the home for severe agitation, that we only want Wilmot to take it if absolutely necessary, i.e., only if he is exhibiting severe agitation, and would recommend only half tablet if to start. Called patient's son Sumeet who assists with medication, provided update. He will stop by this evening to check on his parents and will instruct his mother re 0.5 mg Ativan if needed.  If evening agitation becomes a common occurrence, we will consider something other than Ativan.  Family is in agreement with this plan.  Copy of this note shared with A@H RN for reinforcement of teaching.     Cintia Ahuja NP  [phone]   
ativan will increase agitation, try depakote sprinkles 125mg po qhs    Dc ativan  
no

## 2019-04-09 NOTE — ED PROVIDER NOTE - CARE PROVIDER_API CALL
David Denny)  Orthopaedic Surgery  [address] [place] [address]  Phone: [phone]  Fax: [phone]  Follow Up Time: 1-3 Days

## 2019-04-18 ENCOUNTER — OUTPATIENT (OUTPATIENT)
Dept: OUTPATIENT SERVICES | Facility: HOSPITAL | Age: 29
LOS: 1 days | Discharge: HOME | End: 2019-04-18

## 2019-04-18 DIAGNOSIS — F10.10 ALCOHOL ABUSE, UNCOMPLICATED: ICD-10-CM

## 2019-04-25 ENCOUNTER — OUTPATIENT (OUTPATIENT)
Dept: OUTPATIENT SERVICES | Facility: HOSPITAL | Age: 29
LOS: 1 days | Discharge: HOME | End: 2019-04-25

## 2019-04-25 DIAGNOSIS — F10.10 ALCOHOL ABUSE, UNCOMPLICATED: ICD-10-CM

## 2019-05-02 ENCOUNTER — OUTPATIENT (OUTPATIENT)
Dept: OUTPATIENT SERVICES | Facility: HOSPITAL | Age: 29
LOS: 1 days | Discharge: HOME | End: 2019-05-02

## 2019-05-02 DIAGNOSIS — F10.10 ALCOHOL ABUSE, UNCOMPLICATED: ICD-10-CM

## 2019-09-08 ENCOUNTER — EMERGENCY (EMERGENCY)
Facility: HOSPITAL | Age: 29
LOS: 0 days | Discharge: HOME | End: 2019-09-08
Attending: EMERGENCY MEDICINE | Admitting: EMERGENCY MEDICINE
Payer: SUBSIDIZED

## 2019-09-08 VITALS — HEART RATE: 97 BPM

## 2019-09-08 VITALS
TEMPERATURE: 98 F | HEART RATE: 131 BPM | OXYGEN SATURATION: 94 % | SYSTOLIC BLOOD PRESSURE: 151 MMHG | RESPIRATION RATE: 16 BRPM | DIASTOLIC BLOOD PRESSURE: 97 MMHG

## 2019-09-08 DIAGNOSIS — Z91.010 ALLERGY TO PEANUTS: ICD-10-CM

## 2019-09-08 DIAGNOSIS — F10.229 ALCOHOL DEPENDENCE WITH INTOXICATION, UNSPECIFIED: ICD-10-CM

## 2019-09-08 DIAGNOSIS — Z91.013 ALLERGY TO SEAFOOD: ICD-10-CM

## 2019-09-08 PROCEDURE — 99283 EMERGENCY DEPT VISIT LOW MDM: CPT

## 2019-09-08 PROCEDURE — 99053 MED SERV 10PM-8AM 24 HR FAC: CPT

## 2019-09-08 NOTE — ED ADULT TRIAGE NOTE - CHIEF COMPLAINT QUOTE
BIBA for alcohol intoxication with [employer], patient had argument with family, patient currently denies SI/HI, patient A & O X 4, ambulating steady

## 2019-09-08 NOTE — ED PROVIDER NOTE - NSFOLLOWUPCLINICS_GEN_ALL_ED_FT
[facility]  Detox Mgmt  [address] [place] [address]  Phone: [phone]  Fax:   Follow Up Time: 1-3 Days

## 2019-09-08 NOTE — ED PROVIDER NOTE - PATIENT PORTAL LINK FT
You can access the FollowMyHealth Patient Portal offered by [facility] by registering at the following website: [email or web address]. By joining [employer]’s FollowMyHealth portal, you will also be able to view your health information using other applications (apps) compatible with our system.
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
## 2019-09-08 NOTE — ED PROVIDER NOTE - NSFOLLOWUPINSTRUCTIONS_ED_ALL_ED_FT
Alcohol Intoxication  Alcohol intoxication occurs when a person no longer thinks clearly or functions well (becomes impaired) after drinking alcohol. Intoxication can occur with even one drink. The level of impairment depends on:    The amount of alcohol the person had.  The person's age, gender, and weight.  How often the person drinks.  Whether the person has other medical conditions, such as diabetes, seizures, or a heart condition.    Alcohol intoxication can range in severity from mild to severe. The condition can be dangerous, especially when caused by drinking large amounts of alcohol in a short period of time (binge drinking) or if the person also took certain prescription medicines or recreational drugs.    What are the signs or symptoms?  Symptoms of mild alcohol intoxication include:    Feeling relaxed or sleepy.  Mild difficulty with:    Coordination.  Speech.  Memory.  Attention.      Symptoms of moderate alcohol intoxication include:    Extreme emotions, such as anger or sadness.  Moderate difficulty with:    Coordination.  Speech.  Memory.  Attention.      Symptoms of severe alcohol intoxication include:    Passing out.  Vomiting.  Confusion.  Slow breathing.  Coma.  Severe difficulty with:    Coordination.  Speech.  Memory.  Attention.      Intoxication, especially in people who are not exposed to alcohol often can progress from mild to severe quickly, and may even cause coma or death.    How is this diagnosed?  This condition may be diagnosed based on:    A medical history.  A physical exam.  A blood test that measures the concentration of alcohol in the blood (blood alcohol content, or VICKIE).  Whether there is a smell of alcohol on the breath.    Your health care provider will ask you how much alcohol you drank and what kind of alcohol you had.    How is this treated?  Usually, treatment is not needed for this condition. Most of the effects of alcohol are temporary and go away as the alcohol naturally leaves the body. Your health care provider may recommend monitoring until the alcohol level starts to drop and it is safe to go home. You may also get fluids through an IV tube to help prevent dehydration. If the intoxication is severe, a breathing machine called a ventilator may be needed to support your breathing.    Follow these instructions at home:  Do not drive after drinking alcohol.  Have someone stay with you while you are intoxicated. You should not be left alone.  Stay hydrated. Drink enough fluid to keep your urine clear or pale yellow.  Avoid caffeine because it can dehydrate you.  ImageTake over-the-counter and prescription medicines only as told by your health care provider.  How is this prevented?  To prevent alcohol intoxication:    Limit alcohol intake to no more than 1 drink a day for nonpregnant women and 2 drinks a day for men. One drink equals 12 oz of beer, 5 oz of wine, or 1½ oz of hard liquor.  Do not drink alcohol on an empty stomach.  Avoid drinking alcohol if:    You are under the legal drinking age.  You are pregnant or may be pregnant.  You are taking medicines that should not be taken with alcohol.  Your drinking causes your medical condition to get worse.  You need to drive or perform activities that require your attention.  You have substance use disorder.      To prevent potentially serious complications of alcohol intoxication, seek immediate medical care if you or someone you know has signs of moderate or severe alcohol intoxication. These include:    Moderate or severe difficulty with:    Coordination.  Speech.  Memory.  Attention.    Passing out.  Confusion.  Vomiting.    Do not leave someone alone if he or she is intoxicated.    Contact a health care provider if:  You do not feel better after a few days.  You are having problems at work, at school, or at home due to drinking.  Get help right away if:  You become shaky when you try to stop drinking.  You shake uncontrollably (have a seizure).  You vomit blood. Blood in vomit may look bright red, or it may look like coffee grounds.  You have blood in your stool. Blood in stool may be bright red, or it may make stool appear black and tarry and make it smell bad.  You become light-headed or you faint.  If you ever feel like you may hurt yourself or others, or have thoughts about taking your own life, get help right away. You can go to your nearest emergency department or call:     Your local emergency services (911 in the [place]   A suicide crisis helpline, such as the National Suicide Prevention Lifeline at 1-758.825.5362. This is open 24 hours a day.     This information is not intended to replace advice given to you by your health care provider. Make sure you discuss any questions you have with your health care provider.

## 2019-09-08 NOTE — ED PROVIDER NOTE - PROGRESS NOTE DETAILS
patient speech clear, speaking in full and complete sentences with steady gait. patient does not wish stay and wants to home.
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
## 2019-10-31 ENCOUNTER — EMERGENCY (EMERGENCY)
Facility: HOSPITAL | Age: 29
LOS: 0 days | Discharge: HOME | End: 2019-10-31
Attending: EMERGENCY MEDICINE | Admitting: EMERGENCY MEDICINE
Payer: SUBSIDIZED

## 2019-10-31 VITALS
RESPIRATION RATE: 18 BRPM | SYSTOLIC BLOOD PRESSURE: 136 MMHG | TEMPERATURE: 97 F | OXYGEN SATURATION: 96 % | HEART RATE: 91 BPM | DIASTOLIC BLOOD PRESSURE: 96 MMHG

## 2019-10-31 VITALS
SYSTOLIC BLOOD PRESSURE: 129 MMHG | OXYGEN SATURATION: 99 % | RESPIRATION RATE: 20 BRPM | DIASTOLIC BLOOD PRESSURE: 59 MMHG | TEMPERATURE: 99 F | HEART RATE: 68 BPM

## 2019-10-31 DIAGNOSIS — F20.9 SCHIZOPHRENIA, UNSPECIFIED: ICD-10-CM

## 2019-10-31 DIAGNOSIS — Z91.010 ALLERGY TO PEANUTS: ICD-10-CM

## 2019-10-31 DIAGNOSIS — R45.6 VIOLENT BEHAVIOR: ICD-10-CM

## 2019-10-31 DIAGNOSIS — F10.20 ALCOHOL DEPENDENCE, UNCOMPLICATED: ICD-10-CM

## 2019-10-31 DIAGNOSIS — Z91.013 ALLERGY TO SEAFOOD: ICD-10-CM

## 2019-10-31 LAB
ALBUMIN SERPL ELPH-MCNC: 5 G/DL — SIGNIFICANT CHANGE UP (ref 3.5–5.2)
ALP SERPL-CCNC: 72 U/L — SIGNIFICANT CHANGE UP (ref 30–115)
ALT FLD-CCNC: 20 U/L — SIGNIFICANT CHANGE UP (ref 0–41)
ANION GAP SERPL CALC-SCNC: 19 MMOL/L — HIGH (ref 7–14)
APAP SERPL-MCNC: <5 UG/ML — LOW (ref 10–30)
AST SERPL-CCNC: 22 U/L — SIGNIFICANT CHANGE UP (ref 0–41)
BASOPHILS # BLD AUTO: 0.03 K/UL — SIGNIFICANT CHANGE UP (ref 0–0.2)
BASOPHILS NFR BLD AUTO: 0.6 % — SIGNIFICANT CHANGE UP (ref 0–1)
BILIRUB SERPL-MCNC: <0.2 MG/DL — SIGNIFICANT CHANGE UP (ref 0.2–1.2)
BUN SERPL-MCNC: 4 MG/DL — LOW (ref 10–20)
CALCIUM SERPL-MCNC: 8.8 MG/DL — SIGNIFICANT CHANGE UP (ref 8.5–10.1)
CHLORIDE SERPL-SCNC: 105 MMOL/L — SIGNIFICANT CHANGE UP (ref 98–110)
CO2 SERPL-SCNC: 23 MMOL/L — SIGNIFICANT CHANGE UP (ref 17–32)
CREAT SERPL-MCNC: 1 MG/DL — SIGNIFICANT CHANGE UP (ref 0.7–1.5)
EOSINOPHIL # BLD AUTO: 0.01 K/UL — SIGNIFICANT CHANGE UP (ref 0–0.7)
EOSINOPHIL NFR BLD AUTO: 0.2 % — SIGNIFICANT CHANGE UP (ref 0–8)
ETHANOL SERPL-MCNC: 381 MG/DL — HIGH
GLUCOSE SERPL-MCNC: 116 MG/DL — HIGH (ref 70–99)
HCT VFR BLD CALC: 49.8 % — SIGNIFICANT CHANGE UP (ref 42–52)
HGB BLD-MCNC: 16.8 G/DL — SIGNIFICANT CHANGE UP (ref 14–18)
IMM GRANULOCYTES NFR BLD AUTO: 1.1 % — HIGH (ref 0.1–0.3)
LYMPHOCYTES # BLD AUTO: 1.71 K/UL — SIGNIFICANT CHANGE UP (ref 1.2–3.4)
LYMPHOCYTES # BLD AUTO: 31.8 % — SIGNIFICANT CHANGE UP (ref 20.5–51.1)
MCHC RBC-ENTMCNC: 32.6 PG — HIGH (ref 27–31)
MCHC RBC-ENTMCNC: 33.7 G/DL — SIGNIFICANT CHANGE UP (ref 32–37)
MCV RBC AUTO: 96.7 FL — HIGH (ref 80–94)
MONOCYTES # BLD AUTO: 0.68 K/UL — HIGH (ref 0.1–0.6)
MONOCYTES NFR BLD AUTO: 12.7 % — HIGH (ref 1.7–9.3)
NEUTROPHILS # BLD AUTO: 2.88 K/UL — SIGNIFICANT CHANGE UP (ref 1.4–6.5)
NEUTROPHILS NFR BLD AUTO: 53.6 % — SIGNIFICANT CHANGE UP (ref 42.2–75.2)
NRBC # BLD: 0 /100 WBCS — SIGNIFICANT CHANGE UP (ref 0–0)
PLATELET # BLD AUTO: 310 K/UL — SIGNIFICANT CHANGE UP (ref 130–400)
POTASSIUM SERPL-MCNC: 3.4 MMOL/L — LOW (ref 3.5–5)
POTASSIUM SERPL-SCNC: 3.4 MMOL/L — LOW (ref 3.5–5)
PROT SERPL-MCNC: 7.7 G/DL — SIGNIFICANT CHANGE UP (ref 6–8)
RBC # BLD: 5.15 M/UL — SIGNIFICANT CHANGE UP (ref 4.7–6.1)
RBC # FLD: 13.2 % — SIGNIFICANT CHANGE UP (ref 11.5–14.5)
SALICYLATES SERPL-MCNC: <0.3 MG/DL — LOW (ref 4–30)
SODIUM SERPL-SCNC: 147 MMOL/L — HIGH (ref 135–146)
WBC # BLD: 5.37 K/UL — SIGNIFICANT CHANGE UP (ref 4.8–10.8)
WBC # FLD AUTO: 5.37 K/UL — SIGNIFICANT CHANGE UP (ref 4.8–10.8)

## 2019-10-31 PROCEDURE — 99284 EMERGENCY DEPT VISIT MOD MDM: CPT

## 2019-10-31 RX ORDER — MIDAZOLAM HYDROCHLORIDE 1 MG/ML
5 INJECTION, SOLUTION INTRAMUSCULAR; INTRAVENOUS ONCE
Refills: 0 | Status: DISCONTINUED | OUTPATIENT
Start: 2019-10-31 | End: 2019-10-31

## 2019-10-31 RX ORDER — HALOPERIDOL DECANOATE 100 MG/ML
5 INJECTION INTRAMUSCULAR ONCE
Refills: 0 | Status: COMPLETED | OUTPATIENT
Start: 2019-10-31 | End: 2019-10-31

## 2019-10-31 RX ADMIN — MIDAZOLAM HYDROCHLORIDE 5 MILLIGRAM(S): 1 INJECTION, SOLUTION INTRAMUSCULAR; INTRAVENOUS at 10:30

## 2019-10-31 RX ADMIN — HALOPERIDOL DECANOATE 5 MILLIGRAM(S): 100 INJECTION INTRAMUSCULAR at 10:30

## 2019-10-31 NOTE — ED PROVIDER NOTE - NSFOLLOWUPCLINICS_GEN_ALL_ED_FT
[facility]  Detox Mgmt  [address] [place] [address]  Phone: [phone]  Fax:   Follow Up Time:

## 2019-10-31 NOTE — ED ADULT NURSE REASSESSMENT NOTE - NS ED NURSE REASSESS COMMENT FT1
Pt arrived to ED accompanied by multiple police offers and EMS, handcuffed by police, threatening staff during registration, ambulated to isolation room, reviewed medical protocol, reviewed plan of care and unit regulations. PT was seen by medical team and stated that he wanted to hurt staff and police officers using aggressive gestures, and spitting at staff. Despite repeated attempts to reorient and calm pt, the patient remains combative and verbally abusive. Pt was four pointed and medicated as ordered. Police remain on scene and 1:1 seated at bedside. Will continue to evaluate for removal of restraints.

## 2019-10-31 NOTE — ED PROVIDER NOTE - ATTENDING CONTRIBUTION TO CARE
physically aggressive to staff.  hx psych and alcohol abuse.  no sign of trauma.  restrained for safety.

## 2019-10-31 NOTE — ED PROVIDER NOTE - PROGRESS NOTE DETAILS
pt sedated for safety.  will observe until sober. Pt sleeping comfortably, on monitor. LE restraints removed. Pt clinically sober, pleasant, ambulating, tolerated po, does not want detox at this time, denies SI/HI/AH/VH.

## 2019-10-31 NOTE — ED ADULT NURSE REASSESSMENT NOTE - REASSESS COMMUNICATION
Pt. is cooperative and calm. Md notified. Restraints discontinued. 1:1 still at the bedside. Will continue to monitor./ED physician notified

## 2019-10-31 NOTE — ED PROVIDER NOTE - NSFOLLOWUPINSTRUCTIONS_ED_ALL_ED_FT
Alcohol Use Disorder  Alcohol use disorder is when your drinking disrupts your daily life. When you have this condition, you drink too much alcohol and you cannot control your drinking.    Alcohol use disorder can cause serious problems with your physical health. It can affect your brain, heart, liver, pancreas, immune system, stomach, and intestines. Alcohol use disorder can increase your risk for certain cancers and cause problems with your mental health, such as depression, anxiety, psychosis, delirium, and dementia. People with this disorder risk hurting themselves and others.    What are the causes?  This condition is caused by drinking too much alcohol over time. It is not caused by drinking too much alcohol only one or two times. Some people with this condition drink alcohol to cope with or escape from negative life events. Others drink to relieve pain or symptoms of mental illness.    What increases the risk?  You are more likely to develop this condition if:    You have a family history of alcohol use disorder.  Your culture encourages drinking to the point of intoxication, or makes alcohol easy to get.  You had a mood or conduct disorder in childhood.  You have been a victim of abuse.  You are an adolescent and:    You have poor grades or difficulties in school.  Your caregivers do not talk to you about saying no to alcohol, or supervise your activities.  You are impulsive or you have trouble with self-control.      What are the signs or symptoms?  Symptoms of this condition include:    Drinking more than you want to.  Drinking for longer than you want to.  Trying several times to drink less or to control your drinking.  Spending a lot of time getting alcohol, drinking, or recovering from drinking.  Craving alcohol.  Having problems at work, at school, or at home due to drinking.  Having problems in relationships due to drinking.  Drinking when it is dangerous to drink, such as before driving a car.  Continuing to drink even though you know you might have a physical or mental problem related to drinking.  Needing more and more alcohol to get the same effect you want from the alcohol (building up tolerance).  Having symptoms of withdrawal when you stop drinking. Symptoms of withdrawal include:    Fatigue.  Nightmares.  Trouble sleeping.  Depression.  Anxiety.  Fever.  Seizures.  Severe confusion.  Feeling or seeing things that are not there (hallucinations).  Tremors.  Rapid heart rate.  Rapid breathing.  High blood pressure.    Drinking to avoid symptoms of withdrawal.    How is this diagnosed?  This condition is diagnosed with an assessment. Your health care provider may start the assessment by asking three or four questions about your drinking.    Your health care provider may perform a physical exam or do lab tests to see if you have physical problems resulting from alcohol use. She or he may refer you to a mental health professional for evaluation.    How is this treated?  Some people with alcohol use disorder are able to reduce their alcohol use to low-risk levels. Others need to completely quit drinking alcohol. When necessary, mental health professionals with specialized training in substance use treatment can help. Your health care provider can help you decide how severe your alcohol use disorder is and what type of treatment you need. The following forms of treatment are available:    Detoxification. Detoxification involves quitting drinking and using prescription medicines within the first week to help lessen withdrawal symptoms. This treatment is important for people who have had withdrawal symptoms before and for heavy drinkers who are likely to have withdrawal symptoms. Alcohol withdrawal can be dangerous, and in severe cases, it can cause death. Detoxification may be provided in a home, community, or primary care setting, or in a hospital or substance use treatment facility.  Counseling. This treatment is also called talk therapy. It is provided by substance use treatment counselors. A counselor can address the reasons you use alcohol and suggest ways to keep you from drinking again or to prevent problem drinking. The goals of talk therapy are to:    Find healthy activities and ways for you to cope with stress.  Identify and avoid the things that trigger your alcohol use.  Help you learn how to handle cravings.    Medicines. Medicines can help treat alcohol use disorder by:    Decreasing alcohol cravings.  Decreasing the positive feeling you have when you drink alcohol.  Causing an uncomfortable physical reaction when you drink alcohol (aversion therapy).    Support groups. Support groups are led by people who have quit drinking. They provide emotional support, advice, and guidance.    ImageThese forms of treatment are often combined. Some people with this condition benefit from a combination of treatments provided by specialized substance use treatment centers.    Follow these instructions at home:  Take over-the-counter and prescription medicines only as told by your health care provider.  Check with your health care provider before starting any new medicines.  Ask friends and family members not to offer you alcohol.  Avoid situations where alcohol is served, including gatherings where others are drinking alcohol.  Create a plan for what to do when you are tempted to use alcohol.  Find hobbies or activities that you enjoy that do not include alcohol.  Keep all follow-up visits as told by your health care provider. This is important.  How is this prevented?  If you drink, limit alcohol intake to no more than 1 drink a day for nonpregnant women and 2 drinks a day for men. One drink equals 12 oz of beer, 5 oz of wine, or 1½ oz of hard liquor.  If you have a mental health condition, get treatment and support.  Do not give alcohol to adolescents.  If you are an adolescent:    Do not drink alcohol.  Do not be afraid to say no if someone offers you alcohol. Speak up about why you do not want to drink. You can be a positive role model for your friends and set a good example for those around you by not drinking alcohol.  If your friends drink, spend time with others who do not drink alcohol. Make new friends who do not use alcohol.  Find healthy ways to manage stress and emotions, such as meditation or deep breathing, exercise, spending time in nature, listening to music, or talking with a trusted friend or family member.    Contact a health care provider if:  You are not able to take your medicines as told.  Your symptoms get worse.  You return to drinking alcohol (relapse) and your symptoms get worse.  Get help right away if:  You have thoughts about hurting yourself or others.  If you ever feel like you may hurt yourself or others, or have thoughts about taking your own life, get help right away. You can go to your nearest emergency department or call:     Your local emergency services (911 in the [place]   A suicide crisis helpline, such as the National Suicide Prevention Lifeline at 1-649.103.4260. This is open 24 hours a day.     Summary  Alcohol use disorder is when your drinking disrupts your daily life. When you have this condition, you drink too much alcohol and you cannot control your drinking.  Treatment may include detoxification, counseling, medicine, and support groups.  Ask friends and family members not to offer you alcohol. Avoid situations where alcohol is served.  Get help right away if you have thoughts about hurting yourself or others.  This information is not intended to replace advice given to you by your health care provider. Make sure you discuss any questions you have with your health care provider.
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
## 2019-10-31 NOTE — ED PROVIDER NOTE - PHYSICAL EXAMINATION
CONSTITUTIONAL: well-appearing, in NAD, intoxicated, agitated.   SKIN: Warm dry, normal skin turgor  HEAD: NCAT  EYES: EOMI, pinpoint pupils, no scleral icterus, conjunctiva pink  NECK: Supple; non tender. Full ROM.  CARD: RRR, no murmurs.  RESP: clear to ausculation b/l. No crackles or wheezing.  ABD: soft, non-tender, non-distended, no rebound or guarding.  EXT: Full ROM, no pedal edema, no calf tenderness  NEURO: normal motor. normal sensory.  PSYCH: Cooperative, appropriate.

## 2019-10-31 NOTE — ED PROVIDER NOTE - OBJECTIVE STATEMENT
29 y.o M w/ pmhx schizophrenia bibems and nypd for aggression. Pt was in a verbal altercation with wife/girlfriend at home. Pt admits to drinking alcohol daily and today but refuses to say how much. Pt not cooperating or answering questions but denies SI/AH/VH. Pt threatening police staff and states he would like to hurt him for coming into his house. Pt spitting on staff and nypd.

## 2019-10-31 NOTE — ED PROVIDER NOTE - PATIENT PORTAL LINK FT
You can access the FollowMyHealth Patient Portal offered by [facility] by registering at the following website: [email or web address]. By joining [employer]’s FollowMyHealth portal, you will also be able to view your health information using other applications (apps) compatible with our system.

## 2019-10-31 NOTE — ED ADULT NURSE NOTE - NSIMPLEMENTINTERV_GEN_ALL_ED
Implemented All Universal Safety Interventions:  [place] to call system. Call bell, personal items and telephone within reach. Instruct patient to call for assistance. Room bathroom lighting operational. Non-slip footwear when patient is off stretcher. Physically safe environment: no spills, clutter or unnecessary equipment. Stretcher in lowest position, wheels locked, appropriate side rails in place.

## 2019-12-04 ENCOUNTER — EMERGENCY (EMERGENCY)
Facility: HOSPITAL | Age: 29
LOS: 0 days | Discharge: HOME | End: 2019-12-04
Attending: EMERGENCY MEDICINE | Admitting: EMERGENCY MEDICINE
Payer: SUBSIDIZED

## 2019-12-04 VITALS — HEART RATE: 90 BPM

## 2019-12-04 VITALS
OXYGEN SATURATION: 97 % | HEIGHT: 73 IN | TEMPERATURE: 96 F | RESPIRATION RATE: 18 BRPM | WEIGHT: 160.06 LBS | SYSTOLIC BLOOD PRESSURE: 129 MMHG | HEART RATE: 108 BPM | DIASTOLIC BLOOD PRESSURE: 96 MMHG
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
## 2019-12-04 DIAGNOSIS — Z91.013 ALLERGY TO SEAFOOD: ICD-10-CM

## 2019-12-04 DIAGNOSIS — H92.09 OTALGIA, UNSPECIFIED EAR: ICD-10-CM

## 2019-12-04 DIAGNOSIS — Z91.010 ALLERGY TO PEANUTS: ICD-10-CM
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
## 2019-12-04 DIAGNOSIS — T16.2XXA FOREIGN BODY IN LEFT EAR, INITIAL ENCOUNTER: ICD-10-CM
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
## 2019-12-04 PROCEDURE — 99282 EMERGENCY DEPT VISIT SF MDM: CPT | Mod: 25

## 2019-12-04 PROCEDURE — 69200 CLEAR OUTER EAR CANAL: CPT

## 2019-12-04 NOTE — ED PROVIDER NOTE - PATIENT PORTAL LINK FT
You can access the FollowMyHealth Patient Portal offered by [facility] by registering at the following website: [email or web address]. By joining [employer]’s FollowMyHealth portal, you will also be able to view your health information using other applications (apps) compatible with our system.

## 2019-12-04 NOTE — ED PROVIDER NOTE - CLINICAL SUMMARY MEDICAL DECISION MAKING FREE TEXT BOX
Patient presents with foreign body to the left ear that was removed successfully. no TM involvement. Patient discharged. Return precautions discussed.

## 2019-12-04 NOTE — ED PROVIDER NOTE - PHYSICAL EXAMINATION
Physical Exam    Vital Signs: I have reviewed the initial vital signs.  Constitutional: well-nourished, appears stated age, no acute distress  Ear: + rubber material noted to left ear canal.   Neuro: AOx3, No focal deficits noted

## 2019-12-04 NOTE — ED PROVIDER NOTE - NSFOLLOWUPINSTRUCTIONS_ED_ALL_ED_FT
Ear Foreign Body    An ear foreign body is an object that is stuck in your ear. The object is usually stuck in the ear canal.    CAUSES  In all ages of people, the most common foreign bodies are insects that enter the ear canal. It is common for young children to put objects into the ear canal. These may include tiara, beads, parts of toys, and any other small objects that fit into the ear. In adults, objects such as cotton swabs may become lodged in the ear canal.     SIGNS AND SYMPTOMS  A foreign body in the ear may cause:    Pain.  Buzzing or roaring sounds.  Hearing loss.  Ear drainage or bleeding.  Nausea and vomiting.  A feeling that your ear is full.    DIAGNOSIS  Your health care provider may be able to diagnose an ear foreign body based on the information that you provide, your symptoms, and a physical exam. Your health care provider may also perform tests, such as testing your hearing and your ear pressure, to check for infection or other problems that are caused by the foreign body in your ear.    TREATMENT  Treatment depends on what the foreign body is, the location of the foreign body in your ear, and whether or not the foreign body has injured any part of your inner ear. If the foreign body is visible to your health care provider, it may be possible to remove the foreign body using:    A tool, such as medical tweezers (forceps) or a suction tube (catheter).  Irrigation. This uses water to flush the foreign body out of your ear. This is used only if the foreign body is not likely to swell or enlarge when it is put in water.    If the foreign body is not visible or your health care provider was not able to remove the foreign body, you may be referred to a specialist for removal. You may also be prescribed antibiotic medicine or ear drops to prevent infection. If the foreign body has caused injury to other parts of your ear, you may need additional treatment.    HOME CARE INSTRUCTIONS  Keep all follow-up visits as directed by your health care provider. This is important.  Take medicines only as directed by your health care provider.  If you were prescribed an antibiotic medicine, finish it all even if you start to feel better.    PREVENTION  Keep small objects out of reach of young children. Tell children not to put anything in their ears.  Do not put anything in your ear, including cotton swabs, to clean your ears. Talk to your health care provider about how to clean your ears safely.    SEEK MEDICAL CARE IF:  You have a headache.  Your have blood coming from your ear.  You have a fever.  You have increased pain or swelling of your ear.  Your hearing is reduced.  You have discharge coming from your ear.    ADDITIONAL NOTES AND INSTRUCTIONS    Please follow up with your Primary MD in 24-48 hr.  Seek immediate medical care for any new/worsening signs or symptoms.

## 2019-12-04 NOTE — ED PROVIDER NOTE - ATTENDING CONTRIBUTION TO CARE
28 yo M pmh of asthma presentsn with pain to the left ear. States that he had earphones in his ear and the rubber piece got stuck in the left ear a few hours prior to arrival. Now having pain and uncomfortable sensation.     CONSTITUTIONAL: Well-developed; well-nourished; in no acute distress.   SKIN: warm, dry  HEAD: Normocephalic; atraumatic.  EYES: PERRL, EOMI, no conjunctival erythema  ENT: No nasal discharge; airway clear. Foreign body in left ear (removed) no TM involvement.   NEURO: Alert, oriented, grossly unremarkable.

## 2019-12-26 ENCOUNTER — EMERGENCY (EMERGENCY)
Facility: HOSPITAL | Age: 29
LOS: 0 days | Discharge: HOME | End: 2019-12-26
Attending: EMERGENCY MEDICINE | Admitting: EMERGENCY MEDICINE

## 2019-12-26 VITALS
TEMPERATURE: 98 F | HEIGHT: 67 IN | WEIGHT: 184.97 LBS | DIASTOLIC BLOOD PRESSURE: 60 MMHG | HEART RATE: 74 BPM | SYSTOLIC BLOOD PRESSURE: 140 MMHG | OXYGEN SATURATION: 98 % | RESPIRATION RATE: 17 BRPM

## 2019-12-26 DIAGNOSIS — Z53.21 PROCEDURE AND TREATMENT NOT CARRIED OUT DUE TO PATIENT LEAVING PRIOR TO BEING SEEN BY HEALTH CARE PROVIDER: ICD-10-CM

## 2019-12-26 RX ORDER — TETANUS TOXOID, REDUCED DIPHTHERIA TOXOID AND ACELLULAR PERTUSSIS VACCINE, ADSORBED 5; 2.5; 8; 8; 2.5 [IU]/.5ML; [IU]/.5ML; UG/.5ML; UG/.5ML; UG/.5ML
0.5 SUSPENSION INTRAMUSCULAR ONCE
Refills: 0 | Status: COMPLETED | OUTPATIENT
Start: 2019-12-26 | End: 2019-12-26

## 2019-12-26 RX ADMIN — TETANUS TOXOID, REDUCED DIPHTHERIA TOXOID AND ACELLULAR PERTUSSIS VACCINE, ADSORBED 0.5 MILLILITER(S): 5; 2.5; 8; 8; 2.5 SUSPENSION INTRAMUSCULAR at 04:15

## 2019-12-26 NOTE — ED ADULT NURSE NOTE - NS_CALLED_NO_RESPONSE_ED_ALL_ED
patient left prior to triage, patient ambulating steady, refusing care, walked out of ED prior to evaluation/Patient called and no answer

## 2020-03-25 ENCOUNTER — EMERGENCY (EMERGENCY)
Facility: HOSPITAL | Age: 30
LOS: 0 days | Discharge: HOME | End: 2020-03-25
Attending: EMERGENCY MEDICINE | Admitting: EMERGENCY MEDICINE
Payer: SUBSIDIZED

## 2020-03-25 VITALS
HEIGHT: 74 IN | RESPIRATION RATE: 18 BRPM | WEIGHT: 175.05 LBS | SYSTOLIC BLOOD PRESSURE: 145 MMHG | TEMPERATURE: 98 F | HEART RATE: 80 BPM | DIASTOLIC BLOOD PRESSURE: 100 MMHG | OXYGEN SATURATION: 100 %

## 2020-03-25 DIAGNOSIS — Z00.00 ENCOUNTER FOR GENERAL ADULT MEDICAL EXAMINATION WITHOUT ABNORMAL FINDINGS: ICD-10-CM

## 2020-03-25 DIAGNOSIS — J45.909 UNSPECIFIED ASTHMA, UNCOMPLICATED: ICD-10-CM

## 2020-03-25 PROCEDURE — 99282 EMERGENCY DEPT VISIT SF MDM: CPT

## 2020-03-25 NOTE — ED PROVIDER NOTE - OBJECTIVE STATEMENT
this is a 29 yo male presents to ed for evaluation . patient no complaints and will like to return to work cl[employer]ia.

## 2020-03-25 NOTE — ED PROVIDER NOTE - CLINICAL SUMMARY MEDICAL DECISION MAKING FREE TEXT BOX
31yo M here for clearance to return to work. Asymptomatic now, no testing indicated. I advised him that if he develops any symptoms, can get covid testing outpatient at [address] [place] [address].

## 2020-03-25 NOTE — ED PROVIDER NOTE - ATTENDING CONTRIBUTION TO CARE
31yo M with PMHx asthma presents to ED to obtain note for clearance to return to work. Pt states 3 days ago had a sore throat and rhinorrhea so he decided to stay home from work for 2 days in case. Pt states symptoms resolved but work wanted him to get cleared. Denies fever, cough, SOB, CP, N/V/D.    Vital signs reviewed  GENERAL: Patient well appearing, NAD  HEAD: NCAT  EYES: Anicteric  ENT: MMM. No pharyngeal erythema or exudates.   RESPIRATORY: Normal respiratory effort. CTA B/L. No wheezing, rales, rhonchi  CARDIOVASCULAR: Regular rate and rhythm  ABDOMEN: Soft. Nondistended. Nontender. No guarding or rebound.   MUSCULOSKELETAL/EXTREMITIES: Brisk cap refill.

## 2020-03-25 NOTE — ED PROVIDER NOTE - PATIENT PORTAL LINK FT
You can access the FollowMyHealth Patient Portal offered by [facility] by registering at the following website: [email or web address]. By joining "[employer]’s FollowMyHealth portal, you will also be able to view your health information using other applications (apps) compatible with our system.

## 2020-03-25 NOTE — ED ADULT NURSE NOTE - NSIMPLEMENTINTERV_GEN_ALL_ED
Implemented All Universal Safety Interventions:  [place] to call system. Call bell, personal items and telephone within reach. Instruct patient to call for assistance. Room bathroom lighting operational. Non-slip footwear when patient is off stretcher. Physically safe environment: no spills, clutter or unnecessary equipment. Stretcher in lowest position, wheels locked, appropriate side rails in place.

## 2020-04-20 ENCOUNTER — EMERGENCY (EMERGENCY)
Facility: HOSPITAL | Age: 30
LOS: 0 days | Discharge: HOME | End: 2020-04-21
Attending: EMERGENCY MEDICINE | Admitting: EMERGENCY MEDICINE
Payer: SUBSIDIZED

## 2020-04-20 VITALS
SYSTOLIC BLOOD PRESSURE: 147 MMHG | RESPIRATION RATE: 18 BRPM | WEIGHT: 175.05 LBS | OXYGEN SATURATION: 100 % | HEIGHT: 68 IN | TEMPERATURE: 97 F | DIASTOLIC BLOOD PRESSURE: 103 MMHG | HEART RATE: 105 BPM

## 2020-04-20 DIAGNOSIS — Z91.013 ALLERGY TO SEAFOOD: ICD-10-CM
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
## 2020-04-20 DIAGNOSIS — S01.81XA LACERATION WITHOUT FOREIGN BODY OF OTHER PART OF HEAD, INITIAL ENCOUNTER: ICD-10-CM
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
## 2020-04-20 DIAGNOSIS — S09.90XA UNSPECIFIED INJURY OF HEAD, INITIAL ENCOUNTER: ICD-10-CM
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
## 2020-04-20 DIAGNOSIS — J45.909 UNSPECIFIED ASTHMA, UNCOMPLICATED: ICD-10-CM

## 2020-04-20 DIAGNOSIS — F10.129 ALCOHOL ABUSE WITH INTOXICATION, UNSPECIFIED: ICD-10-CM

## 2020-04-20 DIAGNOSIS — Z91.010 ALLERGY TO PEANUTS: ICD-10-CM

## 2020-04-20 PROCEDURE — 99053 MED SERV 10PM-8AM 24 HR FAC: CPT

## 2020-04-20 PROCEDURE — 99285 EMERGENCY DEPT VISIT HI MDM: CPT | Mod: 25

## 2020-04-20 PROCEDURE — 12013 RPR F/E/E/N/L/M 2.6-5.0 CM: CPT

## 2020-04-20 RX ORDER — MIDAZOLAM HYDROCHLORIDE 1 MG/ML
5 INJECTION, SOLUTION INTRAMUSCULAR; INTRAVENOUS ONCE
Refills: 0 | Status: DISCONTINUED | OUTPATIENT
Start: 2020-04-20 | End: 2020-04-20

## 2020-04-20 NOTE — ED PROCEDURE NOTE - ATTENDING CONTRIBUTION TO CARE
I personally evaluated the patient. I reviewed the Resident’s or Physician Assistant’s note (as assigned above), and agree with the findings and plan except as documented in my note. I was present for and supervised the key and critical aspects of the procedures performed during the care of the patient.

## 2020-04-20 NOTE — ED ADULT NURSE NOTE - NSIMPLEMENTINTERV_GEN_ALL_ED
Implemented All Fall Risk Interventions:  [place] to call system. Call bell, personal items and telephone within reach. Instruct patient to call for assistance. Room bathroom lighting operational. Non-slip footwear when patient is off stretcher. Physically safe environment: no spills, clutter or unnecessary equipment. Stretcher in lowest position, wheels locked, appropriate side rails in place. Provide visual cue, wrist band, yellow gown, etc. Monitor gait and stability. Monitor for mental status changes and reorient to person, place, and time. Review medications for side effects contributing to fall risk. Reinforce activity limits and safety measures with patient and family.

## 2020-04-21 VITALS
SYSTOLIC BLOOD PRESSURE: 133 MMHG | TEMPERATURE: 97 F | HEART RATE: 88 BPM | RESPIRATION RATE: 20 BRPM | DIASTOLIC BLOOD PRESSURE: 70 MMHG | OXYGEN SATURATION: 100 %

## 2020-04-21 PROCEDURE — 72125 CT NECK SPINE W/O DYE: CPT | Mod: 26

## 2020-04-21 PROCEDURE — 70450 CT HEAD/BRAIN W/O DYE: CPT | Mod: 26

## 2020-04-21 NOTE — ED PROVIDER NOTE - PATIENT PORTAL LINK FT
You can access the FollowMyHealth Patient Portal offered by [facility] by registering at the following website: [email or web address]. By joining [employer]’s FollowMyHealth portal, you will also be able to view your health information using other applications (apps) compatible with our system.

## 2020-04-21 NOTE — ED PROVIDER NOTE - OBJECTIVE STATEMENT
30 year old male brought in by ambulance and [facility] for head injury and laceration. As per patient he is under arrest and drank tonight and hit his head on the window and cut himself. patient denies loss of consciousness. Pt does not want to kill himself.

## 2020-04-21 NOTE — ED PROVIDER NOTE - PHYSICAL EXAMINATION
Physical Exam    Vital Signs: I have reviewed the initial vital signs.  Constitutional: well-nourished, appears stated age, no acute distress  Eyes: Conjunctiva pink, Sclera clear, PERRLA, EOMI.  Cardiovascular: S1 and S2, regular rate, regular rhythm, well-perfused extremities, radial pulses equal and 2+  Respiratory: unlabored respiratory effort, clear to auscultation bilaterally no wheezing, rales and rhonchi  Gastrointestinal: soft, non-tender abdomen, no pulsatile mass, normal bowl sounds  Musculoskeletal: supple neck, no lower extremity edema, no midline tenderness  Integumentary: warm, dry, +2.7cm lac  Neurologic: awake, alert, cranial nerves II-XII grossly intact, extremities’ motor and sensory functions grossly intact  Psychiatric: appropriate mood, appropriate affect

## 2020-04-21 NOTE — ED PROVIDER NOTE - NSFOLLOWUPCLINICS_GEN_ALL_ED_FT
[facility]  Medicine  [address] [place], [place]   Phone: [phone]  Fax:   Follow Up Time: 1-3 Days

## 2020-04-21 NOTE — ED PROVIDER NOTE - NS ED ROS FT
Constitutional: (-) fever  Eyes/ENT: (-) blurry vision, (-) epistaxis  Cardiovascular: (-) chest pain, (-) syncope  Respiratory: (-) cough, (-) shortness of breath  Gastrointestinal: (-) vomiting, (-) diarrhea  Musculoskeletal: (-) neck pain, (-) back pain, (-) joint pain  Integumentary: + lac   Neurological: (-) headache, (-) altered mental status  Psychiatric: (-) hallucinations  Allergic/Immunologic: (-) pruritus

## 2020-04-21 NOTE — ED PROVIDER NOTE - CLINICAL SUMMARY MEDICAL DECISION MAKING FREE TEXT BOX
Pt  presents to ED for  [facility]  -  per patient  he was angry ,  drinking etoh - slammed his head against plexi glass in  car.   reviewed acr -   possible SI  expressed by  rowena.  I spoke with patietn  he denies  SI -  said he did it  from anger.  I spoke with NYKATHIE at bedside in ED -  denies  patient  having any SI.   lac  repaired. tetanus up to date.  CT head c spine no acute injury  Patient to be discharged from ED. Any available test results were discussed with and printed  for patient.  Verbal instructions given, including instructions to return to ED immediately for any new, worsening, or concerning symptoms. Limitations of ED work up discussed.  Patient reports understanding of above with capacity and insight. Written discharge instructions additionally given, including follow-up plan.  Dcd n rowena custody

## 2020-04-21 NOTE — ED PROVIDER NOTE - NSFOLLOWUPINSTRUCTIONS_ED_ALL_ED_FT
Follow up with your primary care doctor in 1-2 days     Sutures will dissolve on their own.    Facial Laceration    WHAT YOU NEED TO KNOW:    A facial laceration is a tear or cut in the skin caused by blunt or shearing forces, or sharp objects. Facial lacerations may be closed within 24 hours of injury.    DISCHARGE INSTRUCTIONS:    Return to the emergency department if:     You have a fever and the wound is painful, warm, or swollen. The wound area may be red, or fluid may come out of it.      You have heavy bleeding or bleeding that does not stop after 10 minutes of holding firm, direct pressure over the wound.    Contact your healthcare provider if:     Your wound reopens or your tape comes off.      Your wound is very painful.      Your wound is not healing, or you think there is an object in the wound.      The skin around your wound stays numb.      You have questions or concerns about your condition or care.    Medicines:     Antibiotics may be given to prevent an infection if your wound was deep and had to be cleaned out.       Take your medicine as directed. Contact your healthcare provider if you think your medicine is not helping or if you have side effects. Tell him of her if you are allergic to any medicine. Keep a list of the medicines, vitamins, and herbs you take. Include the amounts, and when and why you take them. Bring the list or the pill bottles to follow-up visits. Carry your medicine list with you in case of an emergency.    Care for your wound: Care for your wound as directed to prevent infection and help it heal. Wash your hands with soap and warm water before and after you care for your wound. You may need to keep the wound dry for the first 24 to 48 hours. When your healthcare provider says it is okay, wash around your wound with soap and water, or as directed. Gently pat the area dry. Do not use alcohol or hydrogen peroxide to clean your wound unless you are directed to.    Do not take aspirin or NSAIDs for 24 hours after being injured. Aspirin and NSAIDs can increase blood flow. Your laceration may continue

## 2020-10-10 ENCOUNTER — EMERGENCY (EMERGENCY)
Facility: HOSPITAL | Age: 30
LOS: 0 days | Discharge: HOME | End: 2020-10-10
Attending: EMERGENCY MEDICINE | Admitting: EMERGENCY MEDICINE
Payer: SUBSIDIZED

## 2020-10-10 VITALS
HEART RATE: 78 BPM | RESPIRATION RATE: 20 BRPM | WEIGHT: 169.98 LBS | SYSTOLIC BLOOD PRESSURE: 139 MMHG | DIASTOLIC BLOOD PRESSURE: 95 MMHG | OXYGEN SATURATION: 99 % | TEMPERATURE: 98 F | HEIGHT: 68 IN

## 2020-10-10 DIAGNOSIS — R21 RASH AND OTHER NONSPECIFIC SKIN ERUPTION: ICD-10-CM

## 2020-10-10 DIAGNOSIS — Z91.013 ALLERGY TO SEAFOOD: ICD-10-CM

## 2020-10-10 DIAGNOSIS — Z91.030 BEE ALLERGY STATUS: ICD-10-CM

## 2020-10-10 PROCEDURE — 99283 EMERGENCY DEPT VISIT LOW MDM: CPT

## 2020-10-10 RX ORDER — PERMETHRIN CREAM 5% W/W 50 MG/G
1 CREAM TOPICAL
Qty: 1 | Refills: 0
Start: 2020-10-10 | End: 2020-10-10

## 2020-10-10 NOTE — ED PROVIDER NOTE - ATTENDING CONTRIBUTION TO CARE
multiple erythematous papuls that are puritic after being in likely infeseted bed. no fevers. exam shows area of burrowed papules with other areas of discrete papules over hand and over abd. IMP: scabies vs bed bugs. will start on permetherin and topical steroid.

## 2020-10-10 NOTE — ED PROVIDER NOTE - NSFOLLOWUPINSTRUCTIONS_ED_ALL_ED_FT
Follow up with your primary medical doctor or the medicine clinic in 1-2 days    Rash    A rash is a change in the color of the skin. A rash can also change the way your skin feels. There are many different conditions and factors that can cause a rash, most of which are not dangerous. Make sure to follow up with your primary care physician or a dermatologist as instructed by your health care provider.    SEEK IMMEDIATE MEDICAL CARE IF YOU HAVE THE FOLLOWING SYMPTOMS: fever, blisters, a rash inside your mouth, or altered mental status.

## 2020-10-10 NOTE — ED ADULT NURSE NOTE - OBJECTIVE STATEMENT
rash to bilateral arms and left side of abdomen. plurtic rash. redness and itching. no environmental triggers, no new medications noted

## 2020-10-10 NOTE — ED PROVIDER NOTE - PHYSICAL EXAMINATION
Physical Exam    Vital Signs: I have reviewed the initial vital signs.  Constitutional: well-nourished, appears stated age, no acute distress  Eyes: Conjunctiva pink, Sclera clear, PERRLA, EOMI.  Musculoskeletal: supple neck, no lower extremity edema, no midline tenderness,   Integumentary: warm, dry, multiple erythematous papules to b/l forearms, also noted (1) linear pattern bite to L forearm.    Neurologic: awake, alert, cranial nerves II-XII grossly intact, extremities’ motor and sensory functions grossly intact  Psychiatric: appropriate mood, appropriate affect

## 2020-10-10 NOTE — ED PROVIDER NOTE - PATIENT PORTAL LINK FT
You can access the FollowMyHealth Patient Portal offered by [facility] by registering at the following website: [email or web address]. By joining [employer]’s FollowMyHealth portal, you will also be able to view your health information using other applications (apps) compatible with our system.

## 2020-10-10 NOTE — ED PROVIDER NOTE - OBJECTIVE STATEMENT
Pt is a 30 year old male with PMH alcoholism presents to ED with c/o rash. Pt states was in motel on Monday 10/05, noted to have rash next day. Pt noted pruritic rash to both arms as well as his L side of his abdomen. Pt denies any other environmental exposures. denies any recent travel. Pt denies any fever, chills, bodyaches, chest pain, sob, abdominal pain, NVCD

## 2020-10-10 NOTE — ED PROVIDER NOTE - NS ED ROS FT
Constitutional: (-) fever  Eyes/ENT: (-) blurry vision, (-) epistaxis  Cardiovascular: (-) chest pain, (-) syncope  Respiratory: (-) cough, (-) shortness of breath  Gastrointestinal: (-) vomiting, (-) diarrhea  Musculoskeletal: (-) neck pain, (-) back pain, (-) joint pain  Integumentary: (+) rash, (-) edema  Neurological: (-) headache, (-) altered mental status  Psychiatric: (-) hallucinations  Allergic/Immunologic: (-) pruritus
Yes

## 2021-06-29 NOTE — ED PROVIDER NOTE - NS ED ROS FT
Detail Level: Generalized Constitutional: (-) fever  Eyes/ENT: (-) blurry vision, (-) epistaxis  Cardiovascular: (-) chest pain, (-) syncope  Respiratory: (-) cough, (-) shortness of breath  Gastrointestinal: (-) vomiting, (-) diarrhea  Musculoskeletal: (-) neck pain, (-) back pain, (-) joint pain  Integumentary: (-) rash, (-) edema  Neurological: (-) headache, (-) altered mental status  Psychiatric: (-) hallucinations  Allergic/Immunologic: (-) pruritus Detail Level: Zone Detail Level: Simple

## 2021-08-16 NOTE — ED ADULT TRIAGE NOTE - LOCATION:
TCM RISK:  This patient is being reviewed for potential inclusion in the High Risk for readmission Care Transition Program.     Left arm;

## 2024-08-08 NOTE — ED PROVIDER NOTE - EXITCARE/DISCHARGE INSTRUCTIONS
Launch Exitcare and print the 'Prescriptions from this Visit' Report no fever/no chills/no sweating/no anorexia/no weight loss

## 2024-10-18 NOTE — ED PROCEDURE NOTE - NS ED PERI NEURO NEG
No indicators present
Pre-application: Motor, sensory, and vascular responses intact in the injured extremity./Post-application: Motor, sensory, and vascular responses intact in the injured extremity./The patient/caregiver verbalized understanding of how to care for the injured extremity with splint

## 2024-11-20 NOTE — ED ADULT TRIAGE NOTE - NS ED NURSE BANDS TYPE
Subjective:      Patient ID: Jhoan Carey is a 25 y.o. male.    HPI  acute visit for follow up on recent er visit.  Woke up and he couldn't walk.  Testicular area on the right very inflamed.  Shooting pain into the groin from the right testicle. Not that swollen or red over the scrotum;/testicle, but some groin rash/linear.   Concerns for epididymitis or other infection at the time.  Ruled out torsion with ultrasound.  Started on keflex and doxycycline. 2 days now, much improved.      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 45 g 0    doxycycline hyclate (VIBRAMYCIN) 100 MG capsule Take 1 capsule by mouth 2 times daily for 10 days 20 capsule 0    cephALEXin (KEFLEX) 500 MG capsule Take 1 capsule by mouth 3 times daily 30 capsule 0    ibuprofen (ADVIL;MOTRIN) 600 MG tablet Take 1 tablet by mouth 3 times daily as needed for Pain (Take with food.) 15 tablet 0    metoprolol succinate (TOPROL XL) 25 MG extended release tablet Take 1 tablet by mouth daily 30 tablet 5    Travoprost, BAK Free, (TRAVATAN Z) 0.004 % SOLN ophthalmic solution PLACE 1 DROP IN BOTH EYES NIGHTLY 7.5 mL 5     No current facility-administered medications for this visit.     No Known Allergies      Review of Systems   Constitutional: Negative.  Negative for fever.   HENT: Negative.     Eyes: Negative.    Respiratory: Negative.     Cardiovascular: Negative.    Gastrointestinal: Negative.    Genitourinary: Negative.    Musculoskeletal: Negative.    Skin: Negative.    Neurological: Negative.    Psychiatric/Behavioral: Negative.         Objective:  Name band;

## 2025-03-10 NOTE — ED PROVIDER NOTE - CHIEF COMPLAINT
Orders:    Comp Metabolic Panel; Future    CBC WITH DIFFERENTIAL; Future    TSH; Future    Lipid Profile; Future    Lipoprotein (a); Future    APOLIPOPROTEIN B; Future    CREATINE KINASE; Future    
  Orders:    HEMOGLOBIN A1C; Future    
The patient is a 28y Male complaining of rib pain/injury.

## 2025-04-03 NOTE — ED ADULT TRIAGE NOTE - ESI TRIAGE ACUITY LEVEL, MLM
2 Continue Regimen: Triple cream but will go through [place] Detail Level: Zone Render In Strict Bullet Format?: No

## 2025-06-28 NOTE — ED PROVIDER NOTE - PROGRESS NOTE
Patient presents to the ED via [facility] with a chief complaint of vaginal concern/bleeding beginning awhile ago. 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 She denies any pain to the area but states it feels weird and at times it can hurt if she sits wrong. Patient states that she wants it removed and no one can figure out what it is.    Denies CP, SOB, nausea, vomiting, fevers, body aches, chills, urinary symptoms, dizziness, or any other associated symptoms.      Improved.